# Patient Record
Sex: FEMALE | Race: WHITE | Employment: FULL TIME | URBAN - NONMETROPOLITAN AREA
[De-identification: names, ages, dates, MRNs, and addresses within clinical notes are randomized per-mention and may not be internally consistent; named-entity substitution may affect disease eponyms.]

---

## 2017-01-20 RX ORDER — METOPROLOL SUCCINATE 25 MG/1
TABLET, EXTENDED RELEASE ORAL
Qty: 90 TABLET | Refills: 3 | Status: SHIPPED | OUTPATIENT
Start: 2017-01-20

## 2017-02-01 RX ORDER — LEVOTHYROXINE SODIUM 0.1 MG/1
TABLET ORAL
Qty: 90 TABLET | Refills: 3 | Status: SHIPPED | OUTPATIENT
Start: 2017-02-01 | End: 2018-08-24 | Stop reason: SDUPTHER

## 2017-02-17 ENCOUNTER — OFFICE VISIT (OUTPATIENT)
Dept: INTERNAL MEDICINE | Age: 57
End: 2017-02-17

## 2017-02-17 VITALS
HEIGHT: 68 IN | WEIGHT: 163.6 LBS | BODY MASS INDEX: 24.8 KG/M2 | SYSTOLIC BLOOD PRESSURE: 112 MMHG | HEART RATE: 80 BPM | DIASTOLIC BLOOD PRESSURE: 68 MMHG

## 2017-02-17 DIAGNOSIS — Z13.220 SCREENING FOR LIPOID DISORDERS: ICD-10-CM

## 2017-02-17 DIAGNOSIS — E03.9 HYPOTHYROIDISM, UNSPECIFIED TYPE: Primary | ICD-10-CM

## 2017-02-17 DIAGNOSIS — I49.3 PVC'S (PREMATURE VENTRICULAR CONTRACTIONS): ICD-10-CM

## 2017-02-17 DIAGNOSIS — F32.A ANXIETY AND DEPRESSION: ICD-10-CM

## 2017-02-17 DIAGNOSIS — F41.9 ANXIETY AND DEPRESSION: ICD-10-CM

## 2017-02-17 DIAGNOSIS — E83.42 HYPOMAGNESEMIA: ICD-10-CM

## 2017-02-17 DIAGNOSIS — M85.80 OSTEOPENIA: ICD-10-CM

## 2017-02-17 PROCEDURE — 99214 OFFICE O/P EST MOD 30 MIN: CPT | Performed by: INTERNAL MEDICINE

## 2017-02-17 RX ORDER — ACETAMINOPHEN 160 MG
1 TABLET,DISINTEGRATING ORAL DAILY
COMMUNITY
End: 2021-09-21

## 2017-02-17 RX ORDER — MECLIZINE HCL 12.5 MG/1
12.5 TABLET ORAL 3 TIMES DAILY PRN
Qty: 30 TABLET | Refills: 0 | Status: SHIPPED | OUTPATIENT
Start: 2017-02-17 | End: 2018-02-23 | Stop reason: SDUPTHER

## 2017-02-17 RX ORDER — ALPRAZOLAM 0.25 MG/1
0.25 TABLET ORAL DAILY PRN
Qty: 30 TABLET | Refills: 0 | Status: SHIPPED | OUTPATIENT
Start: 2017-02-17 | End: 2018-02-23 | Stop reason: SDUPTHER

## 2017-02-17 RX ORDER — CYCLOSPORINE 0.5 MG/ML
1 EMULSION OPHTHALMIC EVERY 12 HOURS
COMMUNITY
Start: 2016-12-19 | End: 2020-06-15

## 2017-02-19 ASSESSMENT — ENCOUNTER SYMPTOMS
DOUBLE VISION: 0
WHEEZING: 0
BLURRED VISION: 0
DIARRHEA: 0
SHORTNESS OF BREATH: 0
CONSTIPATION: 0
EYE PAIN: 0
EYE DISCHARGE: 0
NAUSEA: 0
BLOOD IN STOOL: 0
SORE THROAT: 0
COUGH: 0
ABDOMINAL PAIN: 0
VOMITING: 0

## 2017-05-11 ENCOUNTER — HOSPITAL ENCOUNTER (OUTPATIENT)
Age: 57
Setting detail: SPECIMEN
Discharge: HOME | End: 2017-05-11
Payer: COMMERCIAL

## 2017-05-11 ENCOUNTER — OFFICE VISIT (OUTPATIENT)
Dept: OBGYN | Age: 57
End: 2017-05-11
Payer: COMMERCIAL

## 2017-05-11 VITALS
DIASTOLIC BLOOD PRESSURE: 68 MMHG | SYSTOLIC BLOOD PRESSURE: 112 MMHG | WEIGHT: 167.6 LBS | HEART RATE: 68 BPM | HEIGHT: 68 IN | BODY MASS INDEX: 25.4 KG/M2

## 2017-05-11 DIAGNOSIS — Z12.31 SCREENING MAMMOGRAM, ENCOUNTER FOR: ICD-10-CM

## 2017-05-11 DIAGNOSIS — Z01.419 WELL FEMALE EXAM WITH ROUTINE GYNECOLOGICAL EXAM: Primary | ICD-10-CM

## 2017-05-11 DIAGNOSIS — Z12.4 CERVICAL CANCER SCREENING: ICD-10-CM

## 2017-05-11 PROCEDURE — 99396 PREV VISIT EST AGE 40-64: CPT | Performed by: NURSE PRACTITIONER

## 2017-05-11 RX ORDER — TERBINAFINE HYDROCHLORIDE 250 MG/1
TABLET ORAL
COMMUNITY
Start: 2017-04-19 | End: 2018-02-23 | Stop reason: ALTCHOICE

## 2017-05-15 DIAGNOSIS — R92.8 FOLLOW-UP EXAMINATION OF ABNORMAL MAMMOGRAM: Primary | ICD-10-CM

## 2017-05-19 LAB — CYTOLOGY REPORT: NORMAL

## 2017-12-27 ENCOUNTER — HOSPITAL ENCOUNTER (OUTPATIENT)
Dept: GENERAL RADIOLOGY | Age: 57
Discharge: HOME | End: 2017-12-27
Payer: COMMERCIAL

## 2017-12-27 DIAGNOSIS — L60.9 NAIL DISORDER: ICD-10-CM

## 2017-12-27 PROCEDURE — 73110 X-RAY EXAM OF WRIST: CPT

## 2018-02-23 ENCOUNTER — HOSPITAL ENCOUNTER (OUTPATIENT)
Dept: LAB | Age: 58
Setting detail: SPECIMEN
Discharge: HOME | End: 2018-02-23
Payer: COMMERCIAL

## 2018-02-23 ENCOUNTER — OFFICE VISIT (OUTPATIENT)
Dept: INTERNAL MEDICINE | Age: 58
End: 2018-02-23
Payer: COMMERCIAL

## 2018-02-23 VITALS
HEART RATE: 72 BPM | SYSTOLIC BLOOD PRESSURE: 118 MMHG | BODY MASS INDEX: 23.89 KG/M2 | HEIGHT: 68 IN | DIASTOLIC BLOOD PRESSURE: 72 MMHG | WEIGHT: 157.6 LBS

## 2018-02-23 DIAGNOSIS — I49.3 PVC'S (PREMATURE VENTRICULAR CONTRACTIONS): ICD-10-CM

## 2018-02-23 DIAGNOSIS — M25.531 RIGHT WRIST PAIN: Primary | ICD-10-CM

## 2018-02-23 DIAGNOSIS — E83.42 HYPOMAGNESEMIA: ICD-10-CM

## 2018-02-23 DIAGNOSIS — E03.9 HYPOTHYROIDISM, UNSPECIFIED TYPE: ICD-10-CM

## 2018-02-23 DIAGNOSIS — M79.644 PAIN OF RIGHT THUMB: ICD-10-CM

## 2018-02-23 DIAGNOSIS — E03.9 HYPOTHYROIDISM, UNSPECIFIED TYPE: Primary | ICD-10-CM

## 2018-02-23 DIAGNOSIS — F32.A ANXIETY AND DEPRESSION: ICD-10-CM

## 2018-02-23 DIAGNOSIS — M85.80 OSTEOPENIA, UNSPECIFIED LOCATION: ICD-10-CM

## 2018-02-23 DIAGNOSIS — F41.9 ANXIETY AND DEPRESSION: ICD-10-CM

## 2018-02-23 LAB
ANION GAP SERPL CALCULATED.3IONS-SCNC: 10 MMOL/L (ref 9–17)
BUN BLDV-MCNC: 17 MG/DL (ref 6–20)
BUN/CREAT BLD: 22 (ref 9–20)
CALCIUM SERPL-MCNC: 9.9 MG/DL (ref 8.6–10.4)
CHLORIDE BLD-SCNC: 101 MMOL/L (ref 98–107)
CHOLESTEROL/HDL RATIO: 2.7
CHOLESTEROL: 192 MG/DL
CO2: 30 MMOL/L (ref 20–31)
CREAT SERPL-MCNC: 0.76 MG/DL (ref 0.5–0.9)
GFR AFRICAN AMERICAN: >60 ML/MIN
GFR NON-AFRICAN AMERICAN: >60 ML/MIN
GFR SERPL CREATININE-BSD FRML MDRD: ABNORMAL ML/MIN/{1.73_M2}
GFR SERPL CREATININE-BSD FRML MDRD: ABNORMAL ML/MIN/{1.73_M2}
GLUCOSE BLD-MCNC: 99 MG/DL (ref 70–99)
HDLC SERPL-MCNC: 71 MG/DL
LDL CHOLESTEROL: 103 MG/DL (ref 0–130)
MAGNESIUM: 2.2 MG/DL (ref 1.6–2.6)
POTASSIUM SERPL-SCNC: 4.2 MMOL/L (ref 3.7–5.3)
SODIUM BLD-SCNC: 141 MMOL/L (ref 135–144)
TRIGL SERPL-MCNC: 90 MG/DL
TSH SERPL DL<=0.05 MIU/L-ACNC: 0.55 MIU/L (ref 0.3–5)
VLDLC SERPL CALC-MCNC: NORMAL MG/DL (ref 1–30)

## 2018-02-23 PROCEDURE — 99214 OFFICE O/P EST MOD 30 MIN: CPT | Performed by: INTERNAL MEDICINE

## 2018-02-23 PROCEDURE — 80048 BASIC METABOLIC PNL TOTAL CA: CPT

## 2018-02-23 PROCEDURE — 80061 LIPID PANEL: CPT

## 2018-02-23 PROCEDURE — 83735 ASSAY OF MAGNESIUM: CPT

## 2018-02-23 PROCEDURE — 36415 COLL VENOUS BLD VENIPUNCTURE: CPT

## 2018-02-23 PROCEDURE — 84443 ASSAY THYROID STIM HORMONE: CPT

## 2018-02-23 RX ORDER — ALPRAZOLAM 0.25 MG/1
0.25 TABLET ORAL DAILY PRN
Qty: 30 TABLET | Refills: 0 | Status: SHIPPED | OUTPATIENT
Start: 2018-02-23 | End: 2019-03-22 | Stop reason: SDUPTHER

## 2018-02-23 RX ORDER — MECLIZINE HCL 12.5 MG/1
12.5 TABLET ORAL 3 TIMES DAILY PRN
Qty: 30 TABLET | Refills: 0 | Status: SHIPPED | OUTPATIENT
Start: 2018-02-23 | End: 2019-03-22

## 2018-02-23 ASSESSMENT — PATIENT HEALTH QUESTIONNAIRE - PHQ9
SUM OF ALL RESPONSES TO PHQ QUESTIONS 1-9: 0
2. FEELING DOWN, DEPRESSED OR HOPELESS: 0
SUM OF ALL RESPONSES TO PHQ9 QUESTIONS 1 & 2: 0
1. LITTLE INTEREST OR PLEASURE IN DOING THINGS: 0

## 2018-02-23 NOTE — PROGRESS NOTES
Visit Information    Have you changed or started any medications since your last visit including any over-the-counter medicines, vitamins, or herbal medicines? yes  Are you having any side effects from any of your medications? -  no  Have you stopped taking any of your medications? Is so, why? -  no    Have you seen any other physician or provider since your last visit? Yes - Records Obtained  Have you had any other diagnostic tests since your last visit? No  Have you been seen in the emergency room and/or had an admission to a hospital since we last saw you? No  Have you had your routine dental cleaning in the past 6 months? yes     Have you activated your [employer] account? If not, what are your barriers?  Yes     Patient Care Team:  Rocco Lenz MD as PCP - General (Internal Medicine)    Medical History Review  Past Medical, Family, and Social History reviewed and does contribute to the patient presenting condition    Health Maintenance   Topic Date Due    Hepatitis C screen  1960    HIV screen  04/28/1975    Shingles Vaccine (1 of 2 - 2 Dose Series) 04/28/2010    Flu vaccine (1) 09/01/2017    TSH testing  02/25/2018    Colon cancer screen colonoscopy  12/30/2018    Breast cancer screen  05/22/2019    DTaP/Tdap/Td vaccine (2 - Td) 12/04/2019    Cervical cancer screen  05/11/2020    Lipid screen  02/25/2022

## 2018-02-26 NOTE — PROGRESS NOTES
vomiting. Genitourinary:  Negative for dysuria, frequency, and hematuria. Musculoskeletal:  Positive for thumb pain. Skin:  Negative for rash. Neurological:  Negative for tingling, sensory change, speech change, focal weakness, seizures, and headaches. Endo/Heme/Allergies:  Does not bruise/bleed easily. Psychiatric/Behavioral:  Negative for hallucinations and suicidal ideas. Patient Active Problem List   Diagnosis    OA (osteoarthritis)    Hypomagnesemia    PVC's (premature ventricular contractions)    Anxiety and depression    Hypothyroidism    Deviated nasal septum    Acquired deformity of nose    Osteopenia       Medications:    Current Outpatient Prescriptions   Medication Sig Dispense Refill    ALPRAZolam (XANAX) 0.25 MG tablet Take 1 tablet by mouth daily as needed for Anxiety for up to 30 days. 30 tablet 0    meclizine (ANTIVERT) 12.5 MG tablet Take 1 tablet by mouth 3 times daily as needed for Dizziness or Nausea 30 tablet 0    RESTASIS 0.05 % ophthalmic emulsion Place 1 drop into both eyes every 12 hours       Cholecalciferol (VITAMIN D3) 2000 UNITS CAPS Take 1 capsule by mouth daily      Probiotic Product (PROBIOTIC DAILY PO) Take 1 capsule by mouth daily      levothyroxine (SYNTHROID) 100 MCG tablet TAKE 1 TABLET DAILY 90 tablet 3    metoprolol succinate (TOPROL XL) 25 MG extended release tablet TAKE 1 TABLET DAILY (Patient taking differently: Taking 1/2 tab po daily) 90 tablet 3    sertraline (ZOLOFT) 25 MG tablet TAKE 1 TABLET DAILY 90 tablet 1    Naproxen Sodium 220 MG CAPS Take 220 mg by mouth as needed 1 tablets every 12 hours      ascorbic acid (VITAMIN C) 500 MG tablet Take 500 mg by mouth daily as needed       Biotin 5000 MCG CAPS Take 2,500 mcg by mouth daily       Melatonin 10 MG CAPS   Take 10 mg by mouth nightly as needed       Omega-3 Fatty Acids (FISH OIL) 1200 MG CAPS Take 2,400 mg by mouth daily.       magnesium (MAGNESIUM-OXIDE) 250 MG TABS tablet Take 500 mg by mouth nightly.  Calcium Carbonate (CALCIUM 600 PO) Take 1 tablet by mouth daily      Multiple Vitamins-Minerals (MULTIVITAMIN GUMMIES WOMENS PO) Take by mouth 2 Gummies daily       No current facility-administered medications for this visit. Past Medical History:        Diagnosis Date    Anxiety and depression     Treated long term with SSRI.  Arthritis     Basal cell carcinoma     History of, abdomen.  Benign paroxysmal positional vertigo     History of.  Carpal tunnel syndrome     History of, EMG 12/09, with mild carpal tunnel syndrome bilaterally.  Dyslipidemia     10 year risk 1.6% 2/17    Hypomagnesemia     History of.  Hypothyroidism     Interstitial cystitis 05/07    History of interstitial cystitis with biopsy, noting interstitial cystitis.  Low magnesium levels     takes magnesium pills    Migraines     History of.    Mitral valve prolapse     Trivial mitral regurgitation on echo, 2002.  Neck pain     History of chronic neck and back pain, stable.  OA (osteoarthritis)     Osteopenia     Dexa 2016 Frax .3% 10 yr risk    PVC's (premature ventricular contractions)     PACs treated with beta blockers.  Rosacea        Family Hx & Social HX    family history includes Breast Cancer in her maternal aunt; Cancer in her father, paternal aunt, and paternal aunt; Heart Attack in her mother; High Blood Pressure in her mother; Kidney Disease in her mother; Liver Cancer in her paternal grandmother; Other in her brother; Thyroid Disease in her mother. History   Smoking Status    Never Smoker   Smokeless Tobacco    Never Used     History   Alcohol Use    0.0 oz/week     Comment: Rarely. Vitals:    02/23/18 0828   BP: 118/72   Site: Left Arm   Position: Sitting   Cuff Size: Medium Adult   Pulse: 72   Weight: 157 lb 9.6 oz (71.5 kg)   Height: 5' 8\" (1.727 m)        Vitals Reviewed. Body mass index is 23.96 kg/m².      Wt Readings from Last 3 Encounters: 02/23/18 157 lb 9.6 oz (71.5 kg)   05/11/17 167 lb 9.6 oz (76 kg)   02/17/17 163 lb 9.6 oz (74.2 kg)     BP Readings from Last 3 Encounters:   02/23/18 118/72   05/11/17 112/68   02/17/17 112/68     Physical Examination:  Constitutional:  She appears well-developed and well-nourished. No distress. HENT:  Head: Normocephalic and atraumatic. Right Ear:  External ear normal.  Left Ear:  External ear normal.  Nose:  Nose normal.  Mouth/Throat:  Oropharynx is clear and moist.  Eyes: Conjunctivae and EOM are normal.  Pupils are equal, round, and reactive to light. Right eye exhibits no discharge. Left eye exhibits no discharge. No scleral icterus. Neck:  Normal range of motion. Neck supple. No JVD present. No tracheal deviation present. No thyromegaly present. Cardiovascular:  Normal rate, normal heart sounds, and intact distal pulses. Exam reveals no gallop. Pulmonary/Chest:  Effort normal and breath sounds normal.  No respiratory distress. She has no wheezes. She has no rales. Abdominal:  Soft. Normal aorta and bowel sounds are normal.  She exhibits no distension and no mass. There is no hepatosplenomegaly. There is no tenderness. There is no rebound and no guarding. Musculoskeletal:  She exhibits no edema or tenderness. Lymphadenopathy:    She has no cervical adenopathy. Neurological:  She is alert. She has normal strength. She displays normal reflexes. No cranial nerve deficit or sensory deficit. She exhibits normal muscle tone. Skin:  Skin is warm and dry. No rash noted. She is not diaphoretic. No pallor. Psychiatric:  She has a normal mood and affect.   Her behavior is normal.  Judgment normal.    Lab Results   Component Value Date    K 4.2 02/23/2018    CREATININE 0.76 02/23/2018    AST 23 02/20/2016    ALT 23 02/20/2016    TSH 0.55 02/23/2018    HCT 42.4 02/20/2016    GLUCOSE 99 02/23/2018    MG 2.2 02/23/2018    CALCIUM 9.9 02/23/2018      Lab Results   Component Value Date    CHOL

## 2018-05-10 ENCOUNTER — OFFICE VISIT (OUTPATIENT)
Dept: OBGYN | Age: 58
End: 2018-05-10
Payer: COMMERCIAL

## 2018-05-10 ENCOUNTER — HOSPITAL ENCOUNTER (OUTPATIENT)
Age: 58
Setting detail: SPECIMEN
Discharge: HOME | End: 2018-05-10
Payer: COMMERCIAL

## 2018-05-10 ENCOUNTER — HOSPITAL ENCOUNTER (OUTPATIENT)
Dept: MAMMOGRAPHY | Age: 58
Discharge: HOME | End: 2018-05-12
Payer: COMMERCIAL

## 2018-05-10 VITALS
SYSTOLIC BLOOD PRESSURE: 112 MMHG | HEIGHT: 68 IN | BODY MASS INDEX: 24.01 KG/M2 | WEIGHT: 158.4 LBS | DIASTOLIC BLOOD PRESSURE: 68 MMHG | HEART RATE: 68 BPM

## 2018-05-10 DIAGNOSIS — Z12.4 SCREENING FOR MALIGNANT NEOPLASM OF CERVIX: ICD-10-CM

## 2018-05-10 DIAGNOSIS — Z12.31 SCREENING MAMMOGRAM, ENCOUNTER FOR: ICD-10-CM

## 2018-05-10 DIAGNOSIS — Z12.31 VISIT FOR SCREENING MAMMOGRAM: ICD-10-CM

## 2018-05-10 DIAGNOSIS — Z78.0 MENOPAUSE: ICD-10-CM

## 2018-05-10 DIAGNOSIS — Z01.419 WELL FEMALE EXAM WITH ROUTINE GYNECOLOGICAL EXAM: Primary | ICD-10-CM

## 2018-05-10 PROCEDURE — 99396 PREV VISIT EST AGE 40-64: CPT | Performed by: NURSE PRACTITIONER

## 2018-05-10 PROCEDURE — G0145 SCR C/V CYTO,THINLAYER,RESCR: HCPCS

## 2018-05-10 PROCEDURE — 77063 BREAST TOMOSYNTHESIS BI: CPT

## 2018-05-10 RX ORDER — ESTRADIOL 10 UG/1
10 INSERT VAGINAL
Qty: 24 TABLET | Refills: 5 | Status: SHIPPED | OUTPATIENT
Start: 2018-05-10 | End: 2020-06-15

## 2018-05-17 ENCOUNTER — HOSPITAL ENCOUNTER (OUTPATIENT)
Dept: BONE DENSITY | Age: 58
Discharge: HOME | End: 2018-05-19

## 2018-05-17 DIAGNOSIS — Z78.0 MENOPAUSE: ICD-10-CM

## 2018-05-17 PROCEDURE — 77080 DXA BONE DENSITY AXIAL: CPT

## 2018-06-06 LAB — CYTOLOGY REPORT: NORMAL

## 2018-08-24 RX ORDER — LEVOTHYROXINE SODIUM 0.1 MG/1
TABLET ORAL
Qty: 90 TABLET | Refills: 3 | Status: SHIPPED | OUTPATIENT
Start: 2018-08-24 | End: 2019-08-26 | Stop reason: SDUPTHER

## 2019-01-24 ENCOUNTER — TELEPHONE (OUTPATIENT)
Dept: INTERNAL MEDICINE | Age: 59
End: 2019-01-24

## 2019-01-24 DIAGNOSIS — E03.9 HYPOTHYROIDISM, UNSPECIFIED TYPE: ICD-10-CM

## 2019-01-24 DIAGNOSIS — E78.5 DYSLIPIDEMIA: ICD-10-CM

## 2019-01-24 DIAGNOSIS — E83.42 HYPOMAGNESEMIA: Primary | ICD-10-CM

## 2019-03-20 ENCOUNTER — HOSPITAL ENCOUNTER (OUTPATIENT)
Dept: LAB | Age: 59
Discharge: HOME | End: 2019-03-20
Payer: COMMERCIAL

## 2019-03-20 DIAGNOSIS — E78.5 DYSLIPIDEMIA: ICD-10-CM

## 2019-03-20 DIAGNOSIS — E03.9 HYPOTHYROIDISM, UNSPECIFIED TYPE: ICD-10-CM

## 2019-03-20 DIAGNOSIS — E83.42 HYPOMAGNESEMIA: ICD-10-CM

## 2019-03-20 LAB
ABSOLUTE EOS #: 0.1 K/UL (ref 0–0.4)
ABSOLUTE IMMATURE GRANULOCYTE: NORMAL K/UL (ref 0–0.3)
ABSOLUTE LYMPH #: 1.8 K/UL (ref 1–4.8)
ABSOLUTE MONO #: 0.5 K/UL (ref 0.1–1.2)
ANION GAP SERPL CALCULATED.3IONS-SCNC: 13 MMOL/L (ref 9–17)
BASOPHILS # BLD: 1 % (ref 0–1)
BASOPHILS ABSOLUTE: 0.1 K/UL (ref 0–0.2)
BUN BLDV-MCNC: 16 MG/DL (ref 6–20)
BUN/CREAT BLD: 21 (ref 9–20)
CALCIUM SERPL-MCNC: 9.7 MG/DL (ref 8.6–10.4)
CHLORIDE BLD-SCNC: 102 MMOL/L (ref 98–107)
CHOLESTEROL/HDL RATIO: 2.6
CHOLESTEROL: 192 MG/DL
CO2: 28 MMOL/L (ref 20–31)
CREAT SERPL-MCNC: 0.77 MG/DL (ref 0.5–0.9)
DIFFERENTIAL TYPE: NORMAL
EOSINOPHILS RELATIVE PERCENT: 3 % (ref 1–7)
GFR AFRICAN AMERICAN: >60 ML/MIN
GFR NON-AFRICAN AMERICAN: >60 ML/MIN
GFR SERPL CREATININE-BSD FRML MDRD: ABNORMAL ML/MIN/{1.73_M2}
GFR SERPL CREATININE-BSD FRML MDRD: ABNORMAL ML/MIN/{1.73_M2}
GLUCOSE BLD-MCNC: 90 MG/DL (ref 70–99)
HCT VFR BLD CALC: 42.4 % (ref 36–46)
HDLC SERPL-MCNC: 73 MG/DL
HEMOGLOBIN: 13.8 G/DL (ref 12–16)
IMMATURE GRANULOCYTES: NORMAL %
LDL CHOLESTEROL: 103 MG/DL (ref 0–130)
LYMPHOCYTES # BLD: 34 % (ref 16–46)
MAGNESIUM: 2.1 MG/DL (ref 1.6–2.6)
MCH RBC QN AUTO: 31.5 PG (ref 26–34)
MCHC RBC AUTO-ENTMCNC: 32.6 G/DL (ref 31–37)
MCV RBC AUTO: 96.4 FL (ref 80–100)
MONOCYTES # BLD: 9 % (ref 4–11)
NRBC AUTOMATED: NORMAL PER 100 WBC
PDW BLD-RTO: 13.1 % (ref 11–14.5)
PLATELET # BLD: 326 K/UL (ref 140–450)
PLATELET ESTIMATE: NORMAL
PMV BLD AUTO: 8.1 FL (ref 6–12)
POTASSIUM SERPL-SCNC: 4.2 MMOL/L (ref 3.7–5.3)
RBC # BLD: 4.4 M/UL (ref 4–5.2)
RBC # BLD: NORMAL 10*6/UL
SEG NEUTROPHILS: 53 % (ref 43–77)
SEGMENTED NEUTROPHILS ABSOLUTE COUNT: 2.8 K/UL (ref 1.8–7.7)
SODIUM BLD-SCNC: 143 MMOL/L (ref 135–144)
TRIGL SERPL-MCNC: 79 MG/DL
TSH SERPL DL<=0.05 MIU/L-ACNC: 0.63 MIU/L (ref 0.3–5)
VLDLC SERPL CALC-MCNC: NORMAL MG/DL (ref 1–30)
WBC # BLD: 5.2 K/UL (ref 3.5–11)
WBC # BLD: NORMAL 10*3/UL

## 2019-03-20 PROCEDURE — 83735 ASSAY OF MAGNESIUM: CPT

## 2019-03-20 PROCEDURE — 84443 ASSAY THYROID STIM HORMONE: CPT

## 2019-03-20 PROCEDURE — 36415 COLL VENOUS BLD VENIPUNCTURE: CPT

## 2019-03-20 PROCEDURE — 80048 BASIC METABOLIC PNL TOTAL CA: CPT

## 2019-03-20 PROCEDURE — 80061 LIPID PANEL: CPT

## 2019-03-20 PROCEDURE — 85025 COMPLETE CBC W/AUTO DIFF WBC: CPT

## 2019-03-22 ENCOUNTER — OFFICE VISIT (OUTPATIENT)
Dept: INTERNAL MEDICINE | Age: 59
End: 2019-03-22
Payer: COMMERCIAL

## 2019-03-22 VITALS
HEART RATE: 60 BPM | WEIGHT: 168.4 LBS | HEIGHT: 68 IN | BODY MASS INDEX: 25.52 KG/M2 | SYSTOLIC BLOOD PRESSURE: 108 MMHG | DIASTOLIC BLOOD PRESSURE: 60 MMHG

## 2019-03-22 DIAGNOSIS — F41.9 ANXIETY: Primary | ICD-10-CM

## 2019-03-22 DIAGNOSIS — M85.80 OSTEOPENIA, UNSPECIFIED LOCATION: ICD-10-CM

## 2019-03-22 DIAGNOSIS — E03.9 HYPOTHYROIDISM, UNSPECIFIED TYPE: ICD-10-CM

## 2019-03-22 DIAGNOSIS — Z00.00 ENCOUNTER FOR WELLNESS EXAMINATION: Primary | ICD-10-CM

## 2019-03-22 DIAGNOSIS — E78.5 DYSLIPIDEMIA: ICD-10-CM

## 2019-03-22 DIAGNOSIS — F32.A ANXIETY AND DEPRESSION: ICD-10-CM

## 2019-03-22 DIAGNOSIS — F41.9 ANXIETY AND DEPRESSION: ICD-10-CM

## 2019-03-22 DIAGNOSIS — E83.42 HYPOMAGNESEMIA: ICD-10-CM

## 2019-03-22 PROCEDURE — 99396 PREV VISIT EST AGE 40-64: CPT | Performed by: INTERNAL MEDICINE

## 2019-03-22 RX ORDER — ALPRAZOLAM 0.25 MG/1
0.25 TABLET ORAL DAILY PRN
Qty: 30 TABLET | Refills: 0 | Status: CANCELLED | OUTPATIENT
Start: 2019-03-22 | End: 2019-04-21

## 2019-03-22 RX ORDER — ALPRAZOLAM 0.25 MG/1
0.25 TABLET ORAL DAILY PRN
Qty: 30 TABLET | Refills: 0 | Status: SHIPPED | OUTPATIENT
Start: 2019-03-22 | End: 2019-08-26 | Stop reason: SDUPTHER

## 2019-03-22 ASSESSMENT — PATIENT HEALTH QUESTIONNAIRE - PHQ9
2. FEELING DOWN, DEPRESSED OR HOPELESS: 0
1. LITTLE INTEREST OR PLEASURE IN DOING THINGS: 0
SUM OF ALL RESPONSES TO PHQ9 QUESTIONS 1 & 2: 0
SUM OF ALL RESPONSES TO PHQ QUESTIONS 1-9: 0
SUM OF ALL RESPONSES TO PHQ QUESTIONS 1-9: 0

## 2019-05-23 ENCOUNTER — HOSPITAL ENCOUNTER (OUTPATIENT)
Dept: MAMMOGRAPHY | Age: 59
Discharge: HOME | End: 2019-05-25
Payer: COMMERCIAL

## 2019-05-23 ENCOUNTER — HOSPITAL ENCOUNTER (OUTPATIENT)
Age: 59
Setting detail: SPECIMEN
Discharge: HOME | End: 2019-05-23
Payer: COMMERCIAL

## 2019-05-23 ENCOUNTER — OFFICE VISIT (OUTPATIENT)
Dept: OBGYN | Age: 59
End: 2019-05-23
Payer: COMMERCIAL

## 2019-05-23 VITALS
HEIGHT: 69 IN | BODY MASS INDEX: 25.03 KG/M2 | WEIGHT: 169 LBS | DIASTOLIC BLOOD PRESSURE: 60 MMHG | SYSTOLIC BLOOD PRESSURE: 96 MMHG | HEART RATE: 70 BPM

## 2019-05-23 DIAGNOSIS — Z12.4 CERVICAL CANCER SCREENING: ICD-10-CM

## 2019-05-23 DIAGNOSIS — Z12.31 VISIT FOR SCREENING MAMMOGRAM: ICD-10-CM

## 2019-05-23 DIAGNOSIS — Z78.0 MENOPAUSE: ICD-10-CM

## 2019-05-23 DIAGNOSIS — Z01.419 WELL FEMALE EXAM WITH ROUTINE GYNECOLOGICAL EXAM: ICD-10-CM

## 2019-05-23 DIAGNOSIS — Z12.4 CERVICAL CANCER SCREENING: Primary | ICD-10-CM

## 2019-05-23 PROCEDURE — 77067 SCR MAMMO BI INCL CAD: CPT

## 2019-05-23 PROCEDURE — G0145 SCR C/V CYTO,THINLAYER,RESCR: HCPCS

## 2019-05-23 PROCEDURE — 99396 PREV VISIT EST AGE 40-64: CPT | Performed by: NURSE PRACTITIONER

## 2019-05-23 NOTE — PROGRESS NOTES
Elba Ott Arps  2019              61 y.o. Chief Complaint   Patient presents with    Gynecologic Exam     pap last pap 5/10/18         No LMP recorded. Patient has had a hysterectomy. No referring provider defined for this encounter. HPI :Annual Exam  Patient presents for annual exam.  Counseling on healthy lifestyle reviewed, as well as the need for self breast exam.  We discussed and reviewed the need for routine screenings and immunization updates when appropriate. Good bladder control. Not using vagifem due to cost.  Will try a sample of Premarin cream and call if she desires a prescription. Performs monthly self breast exams. The patient is sexually active. last pap: was normal, last mammogram: was normal  The patient has regular exercise: yes . We did review the need for and frequency of both weight bearing and strengthening as tolerated by the patient. ________________________________________________________________________  OB History    Para Term  AB Living   2 2 2 0 0 2   SAB TAB Ectopic Molar Multiple Live Births   0 0 0 0 0 0      # Outcome Date GA Lbr David/2nd Weight Sex Delivery Anes PTL Lv   2 Term     F Vag-Spont         Birth Comments: on bed rest for 5 weeks delivered ant 36 weeks   1 Term     M Vag-Spont         Birth Comments: born 31 weeks     Past Medical History:   Diagnosis Date    Anxiety and depression     Treated long term with SSRI.  Arthritis     Basal cell carcinoma     History of, abdomen.  Benign paroxysmal positional vertigo     History of.  Carpal tunnel syndrome     History of, EMG , with mild carpal tunnel syndrome bilaterally.  Dyslipidemia     10 year risk 1.6%     Hypomagnesemia     History of.  Hypothyroidism     Interstitial cystitis     History of interstitial cystitis with biopsy, noting interstitial cystitis.  Low magnesium levels     takes magnesium pills    Migraines     History of.  Mitral valve prolapse     Trivial mitral regurgitation on echo, 2002.  Neck pain     History of chronic neck and back pain, stable.  OA (osteoarthritis)     Osteopenia     Dexa 2016 Frax .3% 10 yr risk, DEXA 5/18 FRAX .5% 10 yr risk for HIP fracture    PVC's (premature ventricular contractions)     PACs treated with beta blockers.  Rosacea                                                                    Past Surgical History:   Procedure Laterality Date    COLONOSCOPY  09/12/02    COLONOSCOPY  12-30-13    normal cs, repeat 5 yrs family hx colon cancer    CYSTOSCOPY  05/11/07    DILATION AND CURETTAGE OF UTERUS  09/27/01    Hysteroscopy.  ELBOW SURGERY      Right elbow epicondylitis surgery in October 2010 with Dr. Jose Garcia.  NOSE SURGERY  04/19/05    Status post fracture with turbinectomy, polyp removed and septoplasty.  NOSE SURGERY  3/18/2005    septorhinoplasty    OTHER SURGICAL HISTORY      TVT. Anterior repair for cystocele done in her 35s.      PARTIAL HYSTERECTOMY  12/18/01    Supracervical.  Ovaries remain    SKIN CANCER EXCISION      Basal cell carcinoma removed from the abdomen in the past.       Family History   Problem Relation Age of Onset    Cancer Father         Bowel, rectal, prostate and skin    Colon Cancer Father     Other Brother         Pneumonia    Heart Attack Mother     High Blood Pressure Mother     Kidney Disease Mother     Thyroid Disease Mother     Breast Cancer Maternal Aunt     Colon Cancer Paternal Aunt     Liver Cancer Paternal Grandmother     Colon Cancer Paternal Aunt     Colon Cancer Sister     Colon Cancer Brother     Colon Cancer Paternal Aunt     Colon Cancer Paternal Aunt     Colon Cancer Paternal Uncle     Colon Cancer Paternal Uncle      Social History     Socioeconomic History    Marital status:      Spouse name: Not on file    Number of children: Not on file    Years of education: Not on file    Highest education level: Not on file   Occupational History    Not on file   Social Needs    Financial resource strain: Not on file    Food insecurity:     Worry: Not on file     Inability: Not on file    Transportation needs:     Medical: Not on file     Non-medical: Not on file   Tobacco Use    Smoking status: Never Smoker    Smokeless tobacco: Never Used   Substance and Sexual Activity    Alcohol use: Yes     Alcohol/week: 0.0 oz     Comment: Rarely.     Drug use: No    Sexual activity: Not on file   Lifestyle    Physical activity:     Days per week: Not on file     Minutes per session: Not on file    Stress: Not on file   Relationships    Social connections:     Talks on phone: Not on file     Gets together: Not on file     Attends Sabianism service: Not on file     Active member of club or organization: Not on file     Attends meetings of clubs or organizations: Not on file     Relationship status: Not on file    Intimate partner violence:     Fear of current or ex partner: Not on file     Emotionally abused: Not on file     Physically abused: Not on file     Forced sexual activity: Not on file   Other Topics Concern    Not on file   Social History Narrative    Not on file       MEDICATIONS:  Current Outpatient Medications   Medication Sig Dispense Refill    levothyroxine (SYNTHROID) 100 MCG tablet TAKE 1 TABLET DAILY 90 tablet 3    Cholecalciferol (VITAMIN D3) 2000 UNITS CAPS Take 1 capsule by mouth daily      Probiotic Product (PROBIOTIC DAILY PO) Take 1 capsule by mouth daily      Calcium Carbonate (CALCIUM 600 PO) Take 1 tablet by mouth daily      metoprolol succinate (TOPROL XL) 25 MG extended release tablet TAKE 1 TABLET DAILY (Patient taking differently: Taking 1/2 tab po daily) 90 tablet 3    sertraline (ZOLOFT) 25 MG tablet TAKE 1 TABLET DAILY 90 tablet 1    Naproxen Sodium 220 MG CAPS Take 220 mg by mouth as needed 1 tablets every 12 hours      Multiple Vitamins-Minerals (MULTIVITAMIN GUMMIES WOMENS PO) Take by mouth 2 Gummies daily      ascorbic acid (VITAMIN C) 500 MG tablet Take 500 mg by mouth daily as needed       Biotin 5000 MCG CAPS Take 2,500 mcg by mouth daily       Omega-3 Fatty Acids (FISH OIL) 1200 MG CAPS Take 2,400 mg by mouth daily.  magnesium (MAGNESIUM-OXIDE) 250 MG TABS tablet Take 500 mg by mouth nightly.  Estradiol (YUVAFEM) 10 MCG TABS vaginal tablet Place 1 tablet vaginally Twice a Week 24 tablet 5    RESTASIS 0.05 % ophthalmic emulsion Place 1 drop into both eyes every 12 hours        No current facility-administered medications for this visit. ALLERGIES:  Allergies as of 05/23/2019 - Review Complete 05/23/2019   Allergen Reaction Noted    Betadine [povidone iodine] Swelling and Rash 10/14/2013    Vicodin [hydrocodone-acetaminophen] Itching and Rash 10/14/2013           Gynecologic History:  Menarche: 13   Menopause at hysterectomy      No LMP recorded. Patient has had a hysterectomy. Hormone Exposure: Yes     Family History of Breast, Ovarian , Colon or Uterine Cancer: Yes See family history     Preventative Health Testing:  Date of Last Pap Smear: 2018  Date of Last Mammogram: 2018  Date of Last Colonoscopy: 2013  Date of Last Bone Density: 2018  ________________________________________________________________________  REVIEW OF SYSTEMS:     A minimum of an eleven point review of systems was completed.     Review Of Systems (11 point):  General ROS:  negative  Hematological and Lymphatic ROS:negative   Breast ROS: negative  Cardiovascular ROS: negative  Respiratory ROS: negative   Gastrointestinal ROS: negative  Genito-Urinary ROS: positive for vaginal dryness  Psychological ROS: negative  Neurological ROS: negative  Musculoskeletal ROS: negative  Dermatological ROS: negative PHYSICAL Exam:     Constitutional:  Blood pressure 96/60, pulse 70, height 5' 8.5\" (1.74 m), weight 169 lb (76.7 kg), not currently breastfeeding. General Appearance: This  is a well Developed, well Nourished, well groomed female. Her BMI was reviewed. Skin:  There was a Normal Inspection of the skin without rashes or lesions. There were no rashes. (Papular, Maculopapular, Hives, Pustular, Macular)     There were no lesions (Ulcers, Erythema, Abn. Appearing Nevi)      Lymphatic:  No Lymph Nodes were Palpable in the neck , axilla or groin. Neck and EENT:  The neck was supple. There were no masses   The thyroid was not enlarged and had no masses. PERRLA, Nares Patent No Masses    Respiratory: The lungs were auscultated and found to be clear. There were no rales, rhonchi or wheezes. There was a good respiratory effort. Cardiovascular: The heart was in a regular rate and rhythm. . No S3 or S4. There was no murmur appreciated. Extremities: The patients extremities were without calf tenderness, edema, or varicosities. There was full range of motion in all four extremities. Pulses in all four extremities    Abdomen: The abdomen was soft and non-tender. There were good bowel sounds in all quadrants and there was no guarding, rebound or rigidity. On evaluation there was no evidence of hepatosplenomegaly and there was no costal vertebral sean tenderness bilaterally. No hernias were appreciated. Psych:   The patient had a normal Orientation to: Time, Place, Person, and Situation  Mood and affect appropriate    Breast:  (Chest)  normal appearance, no masses or tenderness, Inspection negative, No nipple retraction or dimpling, No nipple discharge or bleeding, No axillary or supraclavicular adenopathy, Normal to palpation without dominant masses, negative findings: normal in size and symmetry, normal contour with no evidence of flattening or dimpling, skin normal, nipples everted without rashes or discharge, palpation negative for masses or nodules, no palpable axillary lymphadenopathy      Pelvic Exam:  External genitalia: normal general appearance  Urinary system: urethral meatus normal  Vaginal: normal mucosa without prolapse or lesions, normal without tenderness, induration or masses and mild atrophy  Cervix: normal appearance and thin prep PAP obtained  Adnexa: normal bimanual exam and non palpable  Uterus: absent and removed surgically    Musculosk:  Normal Gait and station was noted. Digits were evaluated without abnormal findings. Range of motion, stability and strength were evaluated and found to be appropriate for the patients age. ASSESSMENT:      61 y.o. Annual   Diagnosis Orders   1. Cervical cancer screening  PAP SMEAR   2. Well female exam with routine gynecological exam     3. Visit for screening mammogram  DOC DIGITAL SCREEN W CAD BILATERAL   4. Menopause  DEXA AXIAL SKELETON W VERTEBRAL FX ASST        Chief Complaint   Patient presents with    Gynecologic Exam     pap last pap 5/10/18         Past Medical History:   Diagnosis Date    Anxiety and depression     Treated long term with SSRI.  Arthritis     Basal cell carcinoma     History of, abdomen.  Benign paroxysmal positional vertigo     History of.  Carpal tunnel syndrome     History of, EMG 12/09, with mild carpal tunnel syndrome bilaterally.  Dyslipidemia     10 year risk 1.6% 2/17    Hypomagnesemia     History of.  Hypothyroidism     Interstitial cystitis 05/07    History of interstitial cystitis with biopsy, noting interstitial cystitis.  Low magnesium levels     takes magnesium pills    Migraines     History of.    Mitral valve prolapse     Trivial mitral regurgitation on echo, 2002.  Neck pain     History of chronic neck and back pain, stable.      OA (osteoarthritis)     Osteopenia     Dexa 2016 Frax .3% 10 yr risk, DEXA 5/18 FRAX .5% 10 yr risk for HIP fracture    PVC's (premature ventricular contractions)     PACs treated with beta blockers.  Rosacea          Patient Active Problem List   Diagnosis    OA (osteoarthritis)    Hypomagnesemia    PVC's (premature ventricular contractions)    Anxiety and depression    Hypothyroidism    Deviated nasal septum    Acquired deformity of nose    Osteopenia          Hereditary Breast, Ovarian, Colon and Uterine Cancer screening Done. Tobacco & Secondary smoke risks reviewed; instructed on cessation and avoidance    PLAN:  No follow-ups on file. Return for annual exams  Mammograms every 1 year. If 35 yo and last mammogram was negative. Routine health maintenance per patients PCP. Orders Placed This Encounter   Procedures    DOC DIGITAL SCREEN W CAD BILATERAL     Standing Status:   Future     Standing Expiration Date:   11/23/2020     Scheduling Instructions: On the day of the exam, the patient should not wear deodorant, lotion, or powder on the breast or underarm area. Wear a two piece outfit and be prepared to give information about prior breast procedures including mammograms, biopsies, or surgeries. If you've had mammograms done elsewhere, please request any previous mammogram films from those organizations prior to your appointment. Order Specific Question:   Reason for exam:     Answer:   SCREENING MAMMOGRAM    DEXA AXIAL SKELETON W VERTEBRAL FX ASST     Standing Status:   Future     Standing Expiration Date:   11/23/2020     Order Specific Question:   Reason for exam:     Answer:   Screening osteoporosis    PAP SMEAR     Patient History:    No LMP recorded. Patient has had a hysterectomy. OBGYN Status: Hysterectomy  Past Surgical History:  09/12/02: COLONOSCOPY  12-30-13: COLONOSCOPY      Comment:  normal cs, repeat 5 yrs family hx colon cancer  05/11/07: CYSTOSCOPY  09/27/01: DILATION AND CURETTAGE OF UTERUS      Comment:  Hysteroscopy.   No date: ELBOW SURGERY      Comment:  Right elbow epicondylitis surgery in October 2010 with                Dr. Silvino Soni. 04/19/05: NOSE SURGERY      Comment:  Status post fracture with turbinectomy, polyp removed                and septoplasty. 3/18/2005: NOSE SURGERY      Comment:  septorhinoplasty  No date: OTHER SURGICAL HISTORY      Comment:  TVT. Anterior repair for cystocele done in her 35s.   12/18/01: PARTIAL HYSTERECTOMY      Comment:  Supracervical.  Ovaries remain  No date: SKIN CANCER EXCISION      Comment:  Basal cell carcinoma removed from the abdomen in the                past.        Social History    Tobacco Use      Smoking status: Never Smoker      Smokeless tobacco: Never Used       Standing Status:   Future     Standing Expiration Date:   5/23/2020     Order Specific Question:   Collection Type     Answer: Thin Prep     Order Specific Question:   Prior Abnormal Pap Test     Answer:   No     Order Specific Question:   Screening or Diagnostic     Answer:   Screening     Order Specific Question:   HPV Requested?      Answer:   Yes - If Abnormal Reflex HPV     Order Specific Question:   High Risk Patient     Answer:   N/A       Jimbo Ye  5/23/2019

## 2019-05-28 LAB — CYTOLOGY REPORT: NORMAL

## 2019-08-26 DIAGNOSIS — F41.9 ANXIETY: ICD-10-CM

## 2019-08-26 RX ORDER — LEVOTHYROXINE SODIUM 0.1 MG/1
TABLET ORAL
Qty: 90 TABLET | Refills: 3 | Status: SHIPPED | OUTPATIENT
Start: 2019-08-26 | End: 2020-09-01

## 2019-08-26 RX ORDER — ALPRAZOLAM 0.25 MG/1
0.25 TABLET ORAL DAILY PRN
Qty: 30 TABLET | Refills: 0 | Status: SHIPPED | OUTPATIENT
Start: 2019-08-26 | End: 2019-09-25

## 2019-10-18 ENCOUNTER — HOSPITAL ENCOUNTER (OUTPATIENT)
Dept: MRI IMAGING | Age: 59
Discharge: HOME | End: 2019-10-20
Payer: COMMERCIAL

## 2019-10-18 DIAGNOSIS — R42 DIZZINESS AND GIDDINESS: ICD-10-CM

## 2019-10-18 PROCEDURE — 2709999900 MRI BRAIN W WO CONTRAST

## 2019-10-18 PROCEDURE — A9579 GAD-BASE MR CONTRAST NOS,1ML: HCPCS | Performed by: PHYSICIAN ASSISTANT

## 2019-10-18 PROCEDURE — 6360000004 HC RX CONTRAST MEDICATION: Performed by: PHYSICIAN ASSISTANT

## 2019-10-18 RX ADMIN — GADOTERIDOL 15 ML: 279.3 INJECTION, SOLUTION INTRAVENOUS at 09:13

## 2020-05-15 LAB
BUN BLDV-MCNC: 19 MG/DL
CALCIUM SERPL-MCNC: 9.9 MG/DL
CHLORIDE BLD-SCNC: 104 MMOL/L
CHOLESTEROL, TOTAL: 189 MG/DL
CHOLESTEROL/HDL RATIO: 3.2
CO2: 27 MMOL/L
CREAT SERPL-MCNC: 0.81 MG/DL
GFR CALCULATED: 79
GLUCOSE BLD-MCNC: 82 MG/DL
HCT VFR BLD CALC: 44.6 % (ref 36–46)
HDLC SERPL-MCNC: 60 MG/DL (ref 35–70)
HEMOGLOBIN: 4.73 G/DL (ref 12–16)
LDL CHOLESTEROL CALCULATED: 105 MG/DL (ref 0–160)
MAGNESIUM: 2.1 MG/DL
PLATELET # BLD: 410 K/ΜL
POTASSIUM SERPL-SCNC: 4.4 MMOL/L
SODIUM BLD-SCNC: 140 MMOL/L
TRIGL SERPL-MCNC: 139 MG/DL
TSH SERPL DL<=0.05 MIU/L-ACNC: 0.52 UIU/ML
VLDLC SERPL CALC-MCNC: 129 MG/DL
WBC # BLD: 11.4 10^3/ML

## 2020-05-27 ENCOUNTER — VIRTUAL VISIT (OUTPATIENT)
Dept: INTERNAL MEDICINE | Age: 60
End: 2020-05-27
Payer: COMMERCIAL

## 2020-05-27 VITALS — WEIGHT: 169 LBS | BODY MASS INDEX: 25.32 KG/M2

## 2020-05-27 PROCEDURE — 99214 OFFICE O/P EST MOD 30 MIN: CPT | Performed by: INTERNAL MEDICINE

## 2020-05-27 RX ORDER — CELECOXIB 200 MG/1
200 CAPSULE ORAL 2 TIMES DAILY PRN
COMMUNITY
End: 2021-09-21

## 2020-05-27 RX ORDER — ALPRAZOLAM 0.25 MG/1
0.25 TABLET ORAL DAILY PRN
Qty: 30 TABLET | Refills: 0 | Status: SHIPPED | OUTPATIENT
Start: 2020-05-27 | End: 2022-02-14 | Stop reason: SDUPTHER

## 2020-05-27 NOTE — PROGRESS NOTES
[place] [address]  Dept: [phone]  Dept Fax: [phone]  Loc: [phone]     Visit Date:  5/27/2020    Patient:  Maria E Rodriguez  YOB: 1960  Provider: Silvino Chapman MD        NEXT VISIT: Follow-up on hip and back pain      HPI:   She underwent an audio/video evaluation today for   Chief Complaint   Patient presents with    Hypothyroidism          Complains of pain in her back that has been going on for the last several years, but has been getting worse over the last several months. Pain is sharp, radiates down her left hip and left leg. Worse with walking and standing, and says that the pain prevents her from walking. Denies fever, chills, bowel/bladder incontinence, weight loss. Moreover, has been also complaining of left hip pain, says that pain in her hip feels like it is in her joint, but is unsure whether it is radiating from her back or not. Worse with walking and standing. Has been prescribed NSAIDs as well as Flexeril, says that there is minimal benefit. Has a history of hypothyroidism which has been stable.     Has a history of anxiety which has been stable on Zoloft as well as Xanax which he uses occasionally      Subjective:      REVIEW OF SYSTEMS    Constitutional: Denies fever, chills  Eyes: Denies recent vision changes  Cardiovascular: Denies chest pain, LL edema  Respiratory: Denies cough, wheezes  Gastrointestinal: Denies abdominal pain, nausea, vomiting  Skin: Denies rash  Endocrine: Denies polyuria  Hematologic: Denies bruising  Genitourinary: Denies dysuria, hematuria  Neurological: Denies headache, numbness        Medications    Current Outpatient Medications:     celecoxib (CELEBREX) 200 MG capsule, Take 200 mg by mouth 2 times daily as needed for Pain, Disp: , Rfl:     ALPRAZolam (XANAX) 0.25 MG tablet, Take 1 tablet by mouth daily as needed for Anxiety for up to 30 days. , Disp: 30 tablet, Rfl: 0    levothyroxine (SYNTHROID) 100 MCG tablet, TAKE 1 TABLET DAILY, Disp: 90 tablet, Rfl: 3    Cholecalciferol (VITAMIN D3) 2000 UNITS CAPS, Take 1 capsule by mouth daily, Disp: , Rfl:     Probiotic Product (PROBIOTIC DAILY PO), Take 1 capsule by mouth daily, Disp: , Rfl:     Calcium Carbonate (CALCIUM 600 PO), Take 1 tablet by mouth daily, Disp: , Rfl:     metoprolol succinate (TOPROL XL) 25 MG extended release tablet, TAKE 1 TABLET DAILY (Patient taking differently: Taking 1/2 tab po daily), Disp: 90 tablet, Rfl: 3    sertraline (ZOLOFT) 25 MG tablet, TAKE 1 TABLET DAILY, Disp: 90 tablet, Rfl: 1    Naproxen Sodium 220 MG CAPS, Take 220 mg by mouth as needed 1 tablets every 12 hours, Disp: , Rfl:     ascorbic acid (VITAMIN C) 500 MG tablet, Take 500 mg by mouth daily as needed , Disp: , Rfl:     Biotin 5000 MCG CAPS, Take 2,500 mcg by mouth daily , Disp: , Rfl:     magnesium (MAGNESIUM-OXIDE) 250 MG TABS tablet, Take 500 mg by mouth nightly., Disp: , Rfl:     Estradiol (YUVAFEM) 10 MCG TABS vaginal tablet, Place 1 tablet vaginally Twice a Week, Disp: 24 tablet, Rfl: 5    RESTASIS 0.05 % ophthalmic emulsion, Place 1 drop into both eyes every 12 hours , Disp: , Rfl:     Multiple Vitamins-Minerals (MULTIVITAMIN GUMMIES WOMENS PO), Take by mouth 2 Gummies daily, Disp: , Rfl:     Omega-3 Fatty Acids (FISH OIL) 1200 MG CAPS, Take 2,400 mg by mouth daily. , Disp: , Rfl:     The patient is allergic to betadine [povidone iodine] and vicodin [hydrocodone-acetaminophen].     Past Medical History  Stephany Maldonado  has a past medical history of Anxiety and depression, Arthritis, Basal cell carcinoma, Benign paroxysmal positional vertigo, Carpal tunnel syndrome, Dyslipidemia, Hypomagnesemia, Hypothyroidism, Interstitial cystitis, Low magnesium levels, Migraines, Mitral valve prolapse, Neck pain, OA (osteoarthritis), Osteopenia, PVC's (premature ventricular contractions), and Rosacea. Past Surgical History  The patient  has a past surgical history that includes other surgical history; partial hysterectomy (cervix not removed) (12/18/01); Nose surgery (04/19/05); Skin cancer excision; Elbow surgery; Cystoscopy (05/11/07); Dilation and curettage of uterus (09/27/01); Colonoscopy (09/12/02); Colonoscopy (12-30-13); and Nose surgery (3/18/2005). Family History  This patient's family history includes Breast Cancer in her maternal aunt; Cancer in her father; Colon Cancer in her brother, father, paternal aunt, paternal aunt, paternal aunt, paternal aunt, paternal uncle, paternal uncle, and sister; Heart Attack in her mother; High Blood Pressure in her mother; Kidney Disease in her mother; Liver Cancer in her paternal grandmother; Other in her brother; Thyroid Disease in her mother. Social History  Criss Nassar  reports that she has never smoked. She has never used smokeless tobacco. She reports current alcohol use. She reports that she does not use drugs. Health Maintenance:    Health Maintenance   Topic Date Due    Colon cancer screen colonoscopy  12/30/2018    DTaP/Tdap/Td vaccine (2 - Td) 12/04/2019    Hepatitis C screen  02/12/2021 (Originally 1960)    HIV screen  02/25/2022 (Originally 4/28/1975)    TSH testing  05/15/2021    Breast cancer screen  05/23/2021    Cervical cancer screen  05/23/2022    Lipid screen  05/15/2025    Flu vaccine  Completed    Shingles Vaccine  Completed    Hepatitis A vaccine  Aged Out    Hepatitis B vaccine  Aged Out    Hib vaccine  Aged Out    Meningococcal (ACWY) vaccine  Aged Out    Pneumococcal 0-64 years Vaccine  Aged Out           Objective:     PHYSICAL EXAM  Due to this being a TeleHealth encounter, evaluation of the following organ systems is limited: Vitals/Constitutional/EENT/Resp/CV/GI//MS/Neuro/Skin/Heme-Lymph-Imm. Constitutional: No acute distress. Sits in chair comfortably. Eyes: No proptosis.  No visible discharge  HENT: External ears normal. No external lesions on the nose  Neck: No gross masses. No visible lesions. Respiratory: Respiratory effort normal. No visible signs of difficulty breathing  Skin: No abnormal rashes. No abnormal masses  Neurologic: Cranial nerves grossly intact  Psychiatric: Normal affect. Alert and oriented        Labs Reviewed 5/27/2020:    Lab Results   Component Value Date    WBC 11.4 05/15/2020    HGB 4.73 (A) 05/15/2020    HCT 44.6 05/15/2020     05/15/2020    CHOL 189 05/15/2020    TRIG 139 05/15/2020    HDL 60 05/15/2020    ALT 23 02/20/2016    AST 23 02/20/2016     05/15/2020    K 4.4 05/15/2020     05/15/2020    CREATININE 0.81 05/15/2020    BUN 19 05/15/2020    CO2 27 05/15/2020    TSH 0.52 05/15/2020       Plan        Chronic low back pain: Given that it prevents her ADLs and affects her mobility, will order MRI of the LS spine. Reassess next visit. Already uses NSAIDs, Flexeril, with minimal benefit. Left hip pain: We will order x-ray of the left hip. Reassess next visit. Hypothyroidism: TSH normal.  Continue levothyroxine. Anxiety: Uses Zoloft as well as Xanax sporadically. Stable. Can continue       Diagnosis Orders   1. Hip pain, chronic, left  XR HIP LEFT (2-3 VIEWS)   2. Chronic low back pain, unspecified back pain laterality, unspecified whether sciatica present  MRI Lumbar Spine WO Contrast   3. Encounter for screening for malignant neoplasm of colon  Sana Soriano MD, General Surgery, [place]   4. Anxiety and depression  ALPRAZolam (XANAX) 0.25 MG tablet           Discussed use, benefit, and side effects of prescribed medications. All patient questions answered. Pt voiced understanding. Reviewed health maintenance. Electronically signed Neena Kruse MD on 5/27/2020 at 9:08 AM      This note has been created using the Epic electronic health record, and dictated in part by [employer] dictation system.  Despite the documenting physician's best efforts, there may be errors in spelling, grammar or syntax. Pursuant to the emergency declaration under the [facility], [facility] waiver authority and the [employer] Act, this Virtual Visit was conducted, with patient's consent, to reduce the patient's risk of exposure to COVID-19 and provide continuity of care for an established patient. Services were provided through a video synchronous discussion virtually to substitute for in-person clinic visit. During the visit, the provider was in his office at [facility] in [place], [place] while the patient was at home.

## 2020-05-28 ENCOUNTER — TELEPHONE (OUTPATIENT)
Dept: SURGERY | Age: 60
End: 2020-05-28

## 2020-06-03 ENCOUNTER — HOSPITAL ENCOUNTER (OUTPATIENT)
Dept: MRI IMAGING | Age: 60
Discharge: HOME | End: 2020-06-05
Payer: COMMERCIAL

## 2020-06-03 ENCOUNTER — HOSPITAL ENCOUNTER (OUTPATIENT)
Dept: GENERAL RADIOLOGY | Age: 60
Discharge: HOME | End: 2020-06-05
Payer: COMMERCIAL

## 2020-06-03 ENCOUNTER — TELEPHONE (OUTPATIENT)
Dept: INTERNAL MEDICINE | Age: 60
End: 2020-06-03

## 2020-06-03 PROCEDURE — 72148 MRI LUMBAR SPINE W/O DYE: CPT

## 2020-06-03 PROCEDURE — 73502 X-RAY EXAM HIP UNI 2-3 VIEWS: CPT

## 2020-06-08 ENCOUNTER — TELEPHONE (OUTPATIENT)
Dept: OBGYN | Age: 60
End: 2020-06-08

## 2020-06-08 NOTE — TELEPHONE ENCOUNTER
Spoke with patient and reminded of overdue mammogram.  Patient wants to wait a few months due to the COVID order extended.

## 2020-06-15 ENCOUNTER — OFFICE VISIT (OUTPATIENT)
Dept: PAIN MANAGEMENT | Age: 60
End: 2020-06-15
Payer: COMMERCIAL

## 2020-06-15 VITALS
HEIGHT: 69 IN | RESPIRATION RATE: 16 BRPM | DIASTOLIC BLOOD PRESSURE: 60 MMHG | SYSTOLIC BLOOD PRESSURE: 94 MMHG | WEIGHT: 174.2 LBS | BODY MASS INDEX: 25.8 KG/M2

## 2020-06-15 PROBLEM — M48.061 NEURAL FORAMINAL STENOSIS OF LUMBAR SPINE: Status: ACTIVE | Noted: 2020-06-15

## 2020-06-15 PROBLEM — M54.16 LUMBAR RADICULOPATHY: Status: ACTIVE | Noted: 2020-06-15

## 2020-06-15 PROBLEM — M47.816 LUMBAR FACET JOINT SYNDROME: Status: ACTIVE | Noted: 2020-06-15

## 2020-06-15 PROCEDURE — 99214 OFFICE O/P EST MOD 30 MIN: CPT | Performed by: NURSE PRACTITIONER

## 2020-06-15 ASSESSMENT — ENCOUNTER SYMPTOMS: BACK PAIN: 1

## 2020-06-16 ENCOUNTER — HOSPITAL ENCOUNTER (OUTPATIENT)
Dept: PHYSICAL THERAPY | Age: 60
Setting detail: THERAPIES SERIES
Discharge: HOME | End: 2020-06-16
Payer: COMMERCIAL

## 2020-06-16 PROCEDURE — 97110 THERAPEUTIC EXERCISES: CPT | Performed by: PHYSICAL THERAPIST

## 2020-06-16 PROCEDURE — 97161 PT EVAL LOW COMPLEX 20 MIN: CPT | Performed by: PHYSICAL THERAPIST

## 2020-06-16 ASSESSMENT — PAIN SCALES - GENERAL: PAINLEVEL_OUTOF10: 9

## 2020-06-16 ASSESSMENT — PAIN DESCRIPTION - FREQUENCY: FREQUENCY: INTERMITTENT

## 2020-06-16 ASSESSMENT — PAIN - FUNCTIONAL ASSESSMENT: PAIN_FUNCTIONAL_ASSESSMENT: PREVENTS OR INTERFERES WITH MANY ACTIVE NOT PASSIVE ACTIVITIES

## 2020-06-16 ASSESSMENT — PAIN DESCRIPTION - ORIENTATION: ORIENTATION: LEFT

## 2020-06-16 ASSESSMENT — PAIN DESCRIPTION - PROGRESSION: CLINICAL_PROGRESSION: GRADUALLY WORSENING

## 2020-06-16 ASSESSMENT — PAIN DESCRIPTION - PAIN TYPE: TYPE: ACUTE PAIN

## 2020-06-16 ASSESSMENT — PAIN DESCRIPTION - LOCATION: LOCATION: BACK;BUTTOCKS;LEG

## 2020-06-16 ASSESSMENT — PAIN DESCRIPTION - ONSET: ONSET: PROGRESSIVE

## 2020-06-16 NOTE — PLAN OF CARE
Poor     Electronically signed by:  Zachary Alvarado PT    If you have any questions or concerns, please don't hesitate to call.   Thank you for your referral.      Physician Signature:________________________________Date:__________________  By signing above, therapists plan is approved by physician

## 2020-06-16 NOTE — FLOWSHEET NOTE
Physical Therapy Daily Treatment Note    Date:  2020    Patient Name:  Virgil Soriano    :  1960  MRN: [id number]  Restrictions/Precautions:     Medical/Treatment Diagnosis Information:   · Diagnosis: M54.16 lumbar radiculopathy. · Treatment Diagnosis: M54.16 L lumbar radiculopathy,   Insurance/Certification information:  PT Insurance Information: [employer]  Physician Information:  Referring Practitioner: Owen Reyes CNP  Plan of care signed (Y/N):  n  Visit# / total visits: 1 /10  Pain level: 8/10     Time In:2:00   Time Out:2:40    Progress Note: [x]  Yes  []  No  Next due by: Visit #10, or 7/15/20      Subjective:   See eval    Objective: See eval  Observation:   Test measurements:      Exercises: there ex for lumbar ROM, reduction of post derangement  Exercise/Equipment Resistance/Repetitions Other comments   Lay prone, L side glide 3'    Prone on elbows 3'    Press up 10x x2    B PKF 10x    Hip extend in prone     Modified extension 10x x2    Back bend 10x         TM retro     Lumbar roll  3'         L sciatic n floss               [x] Provided verbal/tactile cueing for activities related to strengthening, flexibility, endurance, ROM. (19601)  [] Provided verbal/tactile cueing for activities related to improving balance, coordination, kinesthetic sense, posture, motor skill, proprioception. (36377)    Therapeutic Activities:     [] Therapeutic activities, direct (one-on-one) patient contact (use of dynamic activities to improve functional performance). (02128)    Gait:   [] Provided training and instruction to the patient for ambulation re-education. (46344)    Self-Care/ADL's  [] Self-care/home management training and compensatory training, meal preparation, safety procedures, and instructions in use of assistive technology devices/adaptive equipment, direct one-on-one contact.  (25565)    Home Exercise Program: Lumbar extension progression for post derangement    [x]

## 2020-06-18 ENCOUNTER — HOSPITAL ENCOUNTER (OUTPATIENT)
Dept: PHYSICAL THERAPY | Age: 60
Setting detail: THERAPIES SERIES
Discharge: HOME | End: 2020-06-18
Payer: COMMERCIAL

## 2020-06-18 PROCEDURE — G0283 ELEC STIM OTHER THAN WOUND: HCPCS

## 2020-06-18 PROCEDURE — 97110 THERAPEUTIC EXERCISES: CPT

## 2020-06-23 ENCOUNTER — HOSPITAL ENCOUNTER (OUTPATIENT)
Dept: PHYSICAL THERAPY | Age: 60
Setting detail: THERAPIES SERIES
Discharge: HOME | End: 2020-06-23
Payer: COMMERCIAL

## 2020-06-23 PROCEDURE — G0283 ELEC STIM OTHER THAN WOUND: HCPCS

## 2020-06-23 PROCEDURE — 97110 THERAPEUTIC EXERCISES: CPT

## 2020-06-23 NOTE — FLOWSHEET NOTE
Physical Therapy Daily Treatment Note    Date:  2020    Patient Name:  Sudhakar Nichols    :  1960  MRN: [id number]  Restrictions/Precautions:     Medical/Treatment Diagnosis Information:   · Diagnosis: M54.16 lumbar radiculopathy. · Treatment Diagnosis: M54.16 L lumbar radiculopathy,   Insurance/Certification information:  PT Insurance Information: [employer]  Physician Information:  Referring Practitioner: Hermelindo Alexandra CNP  Plan of care signed (Y/N):  Y   Visit# / total visits: 3/10  Pain level: 3/10     Time In: 2:04   Time Out:2:48    Progress Note: []  Yes  [x]  No  Next due by: Visit #10, or 7/15/20      Subjective:Pt reports LB back with radicular symptoms to front of R thigh. Reports the pain has switched from the L side to the R. Reports completing prone lying yesterday and was not able to move into prone prop. Objective: there ex for lumbar ROM, reduction of post derangement. Reports sharp shooting pain in R thigh with prone lying. Observation: decreased core strength/stabilization   Test measurements:      Exercises:   Exercise/Equipment Resistance/Repetitions Other comments   Lay prone, L side glide 3'    Prone on elbows 3'    Press up 10x x3    B PKF 10x x3     Hip extend in prone 10x     Modified extension 15x     Back bend 15x         Bridges  10x  FT, FA    Hip ext/abd           TM retro     Lumbar roll           L sciatic n floss               [x] Provided verbal/tactile cueing for activities related to strengthening, flexibility, endurance, ROM. (69018)  [] Provided verbal/tactile cueing for activities related to improving balance, coordination, kinesthetic sense, posture, motor skill, proprioception. (22459)    Therapeutic Activities:     [] Therapeutic activities, direct (one-on-one) patient contact (use of dynamic activities to improve functional performance). (11779)    Gait:   [] Provided training and instruction to the patient for ambulation re-education.
controlled          Plan:   [x] Continue per plan of care [] Alter current plan (see comments)  [] Plan of care initiated [] Hold pending MD visit [] Discharge  Plan for Next Session: Progress per patient tolerance.  Focus on core and lumbar strength/stabilization     Electronically signed by:  Elba Barakat

## 2020-06-25 ENCOUNTER — HOSPITAL ENCOUNTER (OUTPATIENT)
Dept: PHYSICAL THERAPY | Age: 60
Setting detail: THERAPIES SERIES
Discharge: HOME | End: 2020-06-25
Payer: COMMERCIAL

## 2020-06-25 PROCEDURE — 97110 THERAPEUTIC EXERCISES: CPT | Performed by: PHYSICAL THERAPY ASSISTANT

## 2020-06-25 NOTE — FLOWSHEET NOTE
Physical Therapy Daily Treatment Note    Date:  2020    Patient Name:  Tanisha Miranda    :  1960  MRN: [id number]  Restrictions/Precautions:     Medical/Treatment Diagnosis Information:   · Diagnosis: M54.16 lumbar radiculopathy. · Treatment Diagnosis: M54.16 L lumbar radiculopathy,   Insurance/Certification information:  PT Insurance Information: [employer]  Physician Information:  Referring Practitioner: Isamar Matos CNP  Plan of care signed (Y/N):  Y   Visit# / total visits: 4/10  Pain level: 0/10    5/10 at worst varies between R/L radicular     Time In: 957   Time [id number]    HX of Skin Cancer    Progress Note: []  Yes  [x]  No  Next due by: Visit #10, or 7/15/20      Subjective: Pt. Relates pain this date rated 5/10 intermittenly    Objective: SRI complete per flow chart to facilitate strength, motion and stability to allow ease with daily activities and ADL's. Verbal cuing for progression, technique and order for exercises. No increase in pain noted at conclusion of session. Added and advanced several strengthening and stability exercises. Observation: decreased core strength/stabilization   Limited lumbar extension ROM.    Test measurements:      Exercises:   Exercise/Equipment Resistance/Repetitions Other comments   Lay prone 5'    Prone on elbows 3'    Press up 10x x3    B PKF 10x x3     Hip extend in prone 10x     Modified extension 15x     Back bend 15x         Bridges  10x  FT, FA    Hip ext/abd  10x 2 sets Initiated   Rows/Ext 10x ea Staggered stance                  TM retro 5' 1.8 MPH   Lumbar roll  Reviewed         L sciatic n floss               [x] Provided verbal/tactile cueing for activities related to strengthening, flexibility, endurance, ROM. (79200)  [] Provided verbal/tactile cueing for activities related to improving balance, coordination, kinesthetic sense, posture, motor skill, proprioception. (82388)    Therapeutic Activities:     [] Therapeutic Hold pending MD visit [] Discharge      Plan for Next Session: Progress per patient tolerance. Focus on core and lumbar strength/stabilization     Electronically signed by:   Domitila Phelps

## 2020-06-26 NOTE — PROGRESS NOTES
I have reviewed and agree to the content of the note written by the PTA.   Electronically signed by Sarita Miguel PT [id number]

## 2020-06-29 ENCOUNTER — HOSPITAL ENCOUNTER (OUTPATIENT)
Dept: PHYSICAL THERAPY | Age: 60
Setting detail: THERAPIES SERIES
Discharge: HOME | End: 2020-06-29
Payer: COMMERCIAL

## 2020-06-29 PROCEDURE — 97110 THERAPEUTIC EXERCISES: CPT

## 2020-06-29 NOTE — FLOWSHEET NOTE
Physical Therapy Daily Treatment Note    Date:  2020    Patient Name:  Mychal Aguila    :  1960  MRN: [id number]  Restrictions/Precautions:     Medical/Treatment Diagnosis Information:   · Diagnosis: M54.16 lumbar radiculopathy. · Treatment Diagnosis: M54.16 L lumbar radiculopathy,   Insurance/Certification information:  PT Insurance Information: [employer]  Physician Information:  Referring Practitioner: Tona Sadler CNP  Plan of care signed (Y/N):  Y   Visit# / total visits: 4/10  Pain level: 0/10    5/10 at worst varies between R/L radicular     Time In: 1:57   Time Out 2:36    HX of Skin Cancer    Progress Note: []  Yes  [x]  No  Next due by: Visit #10, or 7/15/20      Subjective: Pt reports pain in the L hip when seated. Reports she is able to sleep all night if positioned properly with no disturbance. Objective: SRI complete per flow chart to facilitate strength, motion and stability to allow ease with daily activities and ADL's. Verbal cuing for progression, technique and order for exercises. No increase in pain noted at conclusion of session. Decreased balance noted with quad. Alt UE/LE          Observation: decreased core strength/stabilization   Limited lumbar extension ROM.    Test measurements:      Exercises:   Exercise/Equipment Resistance/Repetitions Other comments   Lay prone 5'    Prone on elbows 3'    Press up 10x x3    B PKF 10x x3     Hip extend in prone 15x     Modified extension 15x     Back bend 15x         Bridges  15x  FT, FA    Hip ext/abd  10x 2 sets Initiated   Rows/Ext 15x ea Staggered stance        Arch/Sag  10x5\"    Quad alt UE/LE  10x                    TM retro 5' 1.8 MPH   Lumbar roll  Reviewed         L sciatic n floss               [x] Provided verbal/tactile cueing for activities related to strengthening, flexibility, endurance, ROM. (72067)  [] Provided verbal/tactile cueing for activities related to improving balance, coordination, kinesthetic sense,

## 2020-07-01 ENCOUNTER — HOSPITAL ENCOUNTER (OUTPATIENT)
Dept: PHYSICAL THERAPY | Age: 60
Setting detail: THERAPIES SERIES
Discharge: HOME | End: 2020-07-01
Payer: COMMERCIAL

## 2020-07-01 PROCEDURE — 97140 MANUAL THERAPY 1/> REGIONS: CPT

## 2020-07-01 PROCEDURE — 97110 THERAPEUTIC EXERCISES: CPT

## 2020-07-01 NOTE — FLOWSHEET NOTE
verbal/tactile cueing for activities related to improving balance, coordination, kinesthetic sense, posture, motor skill, proprioception. (69269)    Therapeutic Activities:     [] Therapeutic activities, direct (one-on-one) patient contact (use of dynamic activities to improve functional performance). (48015)    Gait:   [] Provided training and instruction to the patient for ambulation re-education. (67228)    Self-Care/ADL's  [] Self-care/home management training and compensatory training, meal preparation, safety procedures, and instructions in use of assistive technology devices/adaptive equipment, direct one-on-one contact. (81628)    Home Exercise Program: Lumbar extension progression for post derangement    [x] Reviewed/Progressed HEP activities related to strengthening, flexibility, endurance, ROM. (24361)  [] Reviewed/Progressed HEP activities related to improving balance, coordination, kinesthetic sense, posture, motor skill, proprioception.  (74627)    Manual Treatments:    [] Provided manual therapy to mobilize soft tissue/joints for the purpose of modulating pain, promoting relaxation,  increasing ROM, reducing/eliminating soft tissue swelling/inflammation/restriction, improving soft tissue extensibility.  (43602)    Service Based Modalities:       Timed Code Treatment Minutes:   8' man, 28' ex  Total Treatment Minutes: 39'     Treatment/Activity Tolerance:  [x] Patient tolerated treatment well [] Patient limited by fatique  [] Patient limited by pain  [] Patient limited by other medical complications  [] Other:     Prognosis: [x] Good [] Fair  [] Poor    Patient Requires Follow-up: [x] Yes  [] No      Goals:  Short term goals  Time Frame for Short term goals: 1 week  Short term goal 1: Start HEP (initiated at [facility])    Long term goals  Time Frame for Long term goals : 4 weeks  Long term goal 1: Pain controlled 1-2/10, with L sciatica resolved 90%  Long term goal 2: Able to resume 2 mile wellness walking Long term goal 3: Able to sit 45 min    Long term goal 4: Standing 60 min with pain controlled      Plan:   [x] Continue per plan of care [] Alter current plan (see comments)  [] Plan of care initiated [] Hold pending MD visit [] Discharge      Plan for Next Session: Progress per patient tolerance. Focus on core and lumbar strength/stabilization.  Recheck pelvis      Electronically signed by:  Shayla Mcgee

## 2020-07-07 ENCOUNTER — HOSPITAL ENCOUNTER (OUTPATIENT)
Dept: PHYSICAL THERAPY | Age: 60
Setting detail: THERAPIES SERIES
Discharge: HOME | End: 2020-07-07
Payer: COMMERCIAL

## 2020-07-07 PROCEDURE — 97110 THERAPEUTIC EXERCISES: CPT

## 2020-07-07 NOTE — FLOWSHEET NOTE
Physical Therapy Daily Treatment Note    Date:  2020    Patient Name:  Huang Pastor    :  1960  MRN: [id number]  Restrictions/Precautions:     Medical/Treatment Diagnosis Information:   · Diagnosis: M54.16 lumbar radiculopathy. · Treatment Diagnosis: M54.16 L lumbar radiculopathy,   Insurance/Certification information:  PT Insurance Information: [employer]  Physician Information:  Referring Practitioner: Alessandro Pineda CNP  Plan of care signed (Y/N):  Y   Visit# / total visits: 8/10  Pain level:     5/10 at worst varies between R/L radicular     Time In: 2:32   Time Out 3:18     HX of Skin Cancer    Progress Note: []  Yes  [x]  No  Next due by: Visit #10, or 7/15/20      Subjective:  Pt rpts to clinic with mild complaints of LBP stating \"I have some pain that wraps around my R thigh but its not bad\". Objective: Pt tolerated todays session well indicating no increase in pain post session. Pt was able to progress multiple exercises with good tolerance and minimal vc required. No rest breaks needed and able to exhibit improved lumbar extension throughout session. No MET correction required this date as pt arrived with proper pelvic alignment. Observation: decreased core stren5'gth/stabilization   Limited lumbar extension ROM.    Test measurements:      Exercises:   Exercise/Equipment Resistance/Repetitions Other comments   Lay prone 5'    Prone on elbows 3'    Press up 10x x3    B PKF 10x x3     Hip extend in prone 15x x 2     Modified extension 15x     Back bend 15x    LTR's 10x5\"    Bridges  15x x 2  FT, FA    Hip ext/abd  10x 2 sets Initiated   Rows/Ext 15x ea vivian Staggered stance        Arch/Sag     Quad alt UE/LE                 TM retro 5'1.8 MPH   Lumbar roll  Reviewed         L sciatic n floss     Met to pelvis           [x] Provided verbal/tactile cueing for activities related to strengthening, flexibility, endurance, ROM. (78800)  [] Provided verbal/tactile cueing for activities related to improving balance, coordination, kinesthetic sense, posture, motor skill, proprioception. (42465)    Therapeutic Activities:     [] Therapeutic activities, direct (one-on-one) patient contact (use of dynamic activities to improve functional performance). (29537)    Gait:   [] Provided training and instruction to the patient for ambulation re-education. (12927)    Self-Care/ADL's  [] Self-care/home management training and compensatory training, meal preparation, safety procedures, and instructions in use of assistive technology devices/adaptive equipment, direct one-on-one contact. (91102)    Home Exercise Program: Lumbar extension progression for post derangement    [x] Reviewed/Progressed HEP activities related to strengthening, flexibility, endurance, ROM. (41670)  [] Reviewed/Progressed HEP activities related to improving balance, coordination, kinesthetic sense, posture, motor skill, proprioception.  (68428)    Manual Treatments:    [] Provided manual therapy to mobilize soft tissue/joints for the purpose of modulating pain, promoting relaxation,  increasing ROM, reducing/eliminating soft tissue swelling/inflammation/restriction, improving soft tissue extensibility.  (75587)    Service Based Modalities:       Timed Code Treatment Minutes:   55' therex    Total Treatment Minutes: 55'      Treatment/Activity Tolerance:  [x] Patient tolerated treatment well [] Patient limited by fatique  [] Patient limited by pain  [] Patient limited by other medical complications  [] Other:     Prognosis: [x] Good [] Fair  [] Poor    Patient Requires Follow-up: [x] Yes  [] No      Goals:  Short term goals  Time Frame for Short term goals: 1 week  Short term goal 1: Start HEP (initiated at [facility])    Long term goals  Time Frame for Long term goals : 4 weeks  Long term goal 1: Pain controlled 1-2/10, with L sciatica resolved 90%  Long term goal 2: Able to resume 2 mile wellness walking   Long term goal 3: Able to sit 45 min Long term goal 4: Standing 60 min with pain controlled      Plan:   [x] Continue per plan of care [] Alter current plan (see comments)  [] Plan of care initiated [] Hold pending MD visit [] Discharge      Plan for Next Session: Progress per patient tolerance. Focus on core and lumbar strength/stabilization.  Recheck pelvis       Electronically signed by:  Lyubov Casarez PTA

## 2020-07-08 ENCOUNTER — HOSPITAL ENCOUNTER (OUTPATIENT)
Dept: PHYSICAL THERAPY | Age: 60
Setting detail: THERAPIES SERIES
Discharge: HOME | End: 2020-07-08
Payer: COMMERCIAL

## 2020-07-08 PROCEDURE — 97110 THERAPEUTIC EXERCISES: CPT

## 2020-07-08 NOTE — FLOWSHEET NOTE
Physical Therapy Daily Treatment Note    Date:  2020    Patient Name:  Adis Ruiz    :  1960  MRN: [id number]  Restrictions/Precautions:     Medical/Treatment Diagnosis Information:   · Diagnosis: M54.16 lumbar radiculopathy. · Treatment Diagnosis: M54.16 L lumbar radiculopathy,   Insurance/Certification information:  PT Insurance Information: [employer]  Physician Information:  Referring Practitioner: Ifeanyi Rodgers CNP  Plan of care signed (Y/N):  Y   Visit# / total visits: 9/10  Pain level:     5/10 at worst varies between R/L radicular     Time In:8:01   Time Out:  8:47      HX of Skin Cancer    Progress Note: []  Yes  [x]  No  Next due by: Visit #10, or 7/15/20      Subjective:  Pt rpts mild aching in the L glute. Objective: Pt tolerated todays session well indicating no increase in pain post session. No rest breaks needed and able to exhibit improved lumbar extension throughout session. No MET correction required this date as pt arrived with proper pelvic alignment. Observation: decreased core stren5'gth/stabilization   Limited lumbar extension ROM. Test measurements:      Exercises:   Exercise/Equipment Resistance/Repetitions Other comments   Lay prone 5'    Prone on elbows 3'    Press up 10x x3    B PKF 10x x3     Hip extend in prone 15x x 2     Modified extension 15x     Back bend 15x    LTR's 10x5\"    Bridges  15x x 2  FT, FA    Hip ext/abd  10x 2 sets Initiated   Rows/Ext 15x ea vivian Staggered stance        Arch/Sag     Quad alt UE/LE                 TM retro 5'2. 0 MPH   Lumbar roll  Reviewed         L sciatic n floss     Met to pelvis           [x] Provided verbal/tactile cueing for activities related to strengthening, flexibility, endurance, ROM. (13838)  [] Provided verbal/tactile cueing for activities related to improving balance, coordination, kinesthetic sense, posture, motor skill, proprioception. (41146)    Therapeutic Activities:     [] Therapeutic activities,

## 2020-07-09 ENCOUNTER — OFFICE VISIT (OUTPATIENT)
Dept: PAIN MANAGEMENT | Age: 60
End: 2020-07-09
Payer: COMMERCIAL

## 2020-07-09 VITALS
BODY MASS INDEX: 25.62 KG/M2 | DIASTOLIC BLOOD PRESSURE: 72 MMHG | HEART RATE: 74 BPM | WEIGHT: 173 LBS | HEIGHT: 69 IN | SYSTOLIC BLOOD PRESSURE: 118 MMHG

## 2020-07-09 PROBLEM — M48.061 SPINAL STENOSIS OF LUMBAR REGION WITHOUT NEUROGENIC CLAUDICATION: Status: ACTIVE | Noted: 2020-07-09

## 2020-07-09 PROCEDURE — 99214 OFFICE O/P EST MOD 30 MIN: CPT | Performed by: NURSE PRACTITIONER

## 2020-07-09 ASSESSMENT — ENCOUNTER SYMPTOMS: BACK PAIN: 1

## 2020-07-09 NOTE — PROGRESS NOTES
differently: Take 12.5 mg by mouth daily ) 90 tablet 1    Naproxen Sodium 220 MG CAPS Take 220 mg by mouth as needed 1 tablets every 12 hours      ascorbic acid (VITAMIN C) 500 MG tablet Take 500 mg by mouth daily as needed       Biotin 5000 MCG CAPS Take 2,500 mcg by mouth daily       magnesium (MAGNESIUM-OXIDE) 250 MG TABS tablet Take 500 mg by mouth nightly. Past Medical History:   Diagnosis Date    Anxiety and depression     Treated long term with SSRI.  Arthritis     Basal cell carcinoma     History of, abdomen.  Benign paroxysmal positional vertigo     History of.  Carpal tunnel syndrome     History of, EMG 12/09, with mild carpal tunnel syndrome bilaterally.  Dyslipidemia     10 year risk 1.6% 2/17    Hypomagnesemia     History of.  Hypothyroidism     Interstitial cystitis 05/07    History of interstitial cystitis with biopsy, noting interstitial cystitis.  Low magnesium levels     takes magnesium pills    Migraines     History of.    Mitral valve prolapse     Trivial mitral regurgitation on echo, 2002.  Neck pain     History of chronic neck and back pain, stable.  OA (osteoarthritis)     Osteopenia     Dexa 2016 Frax .3% 10 yr risk, DEXA 5/18 FRAX .5% 10 yr risk for HIP fracture    PVC's (premature ventricular contractions)     PACs treated with beta blockers.  Rosacea        Past Surgical History:   Procedure Laterality Date    COLONOSCOPY  09/12/02    COLONOSCOPY  12-30-13    normal cs, repeat 5 yrs family hx colon cancer    CYSTOSCOPY  05/11/07    DILATION AND CURETTAGE OF UTERUS  09/27/01    Hysteroscopy.  ELBOW SURGERY      Right elbow epicondylitis surgery in October 2010 with Dr. Alma Rosa Cabrera.  NOSE SURGERY  04/19/05    Status post fracture with turbinectomy, polyp removed and septoplasty.  NOSE SURGERY  3/18/2005    septorhinoplasty    OTHER SURGICAL HISTORY      TVT. Anterior repair for cystocele done in her 35s.      PARTIAL HYSTERECTOMY  12/18/01    Supracervical.  Ovaries remain    SKIN CANCER EXCISION      Basal cell carcinoma removed from the abdomen in the past.         Family History   Problem Relation Age of Onset    Cancer Father         Bowel, rectal, prostate and skin    Colon Cancer Father     Other Brother         Pneumonia    Heart Attack Mother     High Blood Pressure Mother     Kidney Disease Mother     Thyroid Disease Mother     Breast Cancer Maternal Aunt     Colon Cancer Paternal Aunt     Liver Cancer Paternal Grandmother     Colon Cancer Paternal Aunt     Colon Cancer Sister     Colon Cancer Brother     Colon Cancer Paternal Aunt     Colon Cancer Paternal Aunt     Colon Cancer Paternal Uncle     Colon Cancer Paternal Uncle        Social History     Socioeconomic History    Marital status:      Spouse name: None    Number of children: None    Years of education: None    Highest education level: None   Occupational History    None   Social Needs    Financial resource strain: None    Food insecurity     Worry: None     Inability: None    Transportation needs     Medical: None     Non-medical: None   Tobacco Use    Smoking status: Never Smoker    Smokeless tobacco: Never Used   Substance and Sexual Activity    Alcohol use: Not Currently     Alcohol/week: 0.0 standard drinks     Comment: Rarely.     Drug use: No    Sexual activity: None   Lifestyle    Physical activity     Days per week: None     Minutes per session: None    Stress: None   Relationships    Social connections     Talks on phone: None     Gets together: None     Attends [employer] service: None     Active member of club or organization: None     Attends meetings of clubs or organizations: None     Relationship status: None    Intimate partner violence     Fear of current or ex partner: None     Emotionally abused: None     Physically abused: None     Forced sexual activity: None   Other Topics Concern    None   Social History Narrative    None     Review of Systems   Constitutional: Positive for activity change (decreased) and unexpected weight change (weight gain due to decrease activity). Musculoskeletal: Positive for arthralgias, back pain and myalgias. Psychiatric/Behavioral: Positive for sleep disturbance. Objective:   Physical Exam  Vitals signs reviewed. Constitutional:       General: She is not in acute distress. Appearance: She is well-developed. Interventions: She is not intubated. HENT:      Head: Normocephalic and atraumatic. Pulmonary:      Effort: Pulmonary effort is normal. No tachypnea, bradypnea, accessory muscle usage, prolonged expiration, respiratory distress or retractions. She is not intubated. Musculoskeletal:      Lumbar back: She exhibits bony tenderness (right lumbar facet). Comments: MRI OF THE LUMBAR SPINE WITHOUT CONTRAST, 6/3/2020 10:20 am     TECHNIQUE:  Multiplanar multisequence MRI of the lumbar spine was performed without the  administration of intravenous contrast.     COMPARISON:  None.     HISTORY:  ORDERING SYSTEM PROVIDED HISTORY: Chronic low back pain, unspecified back  pain laterality, unspecified whether sciatica present  TECHNOLOGIST PROVIDED HISTORY:  Reason for Exam: Low back pain, bilateral hip pain and left leg numbness to  the knee. History of skin caner.   Acuity: Chronic  Type of Exam: Unknown  Additional signs and symptoms: Chronic low back pain, unspecified back pain  laterality, unspecified whether sciatica present.     FINDINGS:  BONES/ALIGNMENT: The vertebral body heights are maintained.  There is  age-appropriate bone marrow signal.  There is degenerative endplate change at  the L4-5 level.  There is multilevel degenerative disc disease with loss of  disc signal.  There is mild disc space narrowing most pronounced in the lower  lumbar spine.  There is no spondylolisthesis.     SPINAL CORD: The conus medullaris is normal in caliber and signal and  terminates at the L1-2 level.  The cauda equina is unremarkable.     SOFT TISSUES: Posterior paraspinal soft tissues are unremarkable.  The  visualized abdominal structures are unremarkable.     L1-L2: There is a circumferential disc bulge with facet hypertrophy.  There  is no canal stenosis or foraminal narrowing.     L2-L3: There is a circumferential disc bulge with a superimposed left  foraminal disc protrusion.  There is no canal stenosis or foraminal narrowing.     L3-L4: There is a circumferential disc bulge with facet hypertrophy.  There  is no canal stenosis or foraminal narrowing.     L4-L5: There is a circumferential disc bulge with facet and ligamentous  hypertrophy.  There is canal stenosis measuring 8 mm in AP dimension.  There  is moderate bilateral foraminal narrowing.     L5-S1: There is a circumferential disc bulge with facet hypertrophy.  There  is no canal stenosis.  There is mild left and moderate right foraminal  narrowing.        Impression  Multilevel degenerative disc disease with associated multilevel degenerative  facet hypertrophy with canal stenosis at L4-5.     Moderate bilateral foraminal narrowing at L4-5 as well as mild left and  moderate right foraminal narrowing at L5-S1. Skin:     General: Skin is warm and dry. Capillary Refill: Capillary refill takes less than 2 seconds. Nails: There is no clubbing. Neurological:      Mental Status: She is alert and oriented to person, place, and time. GCS: GCS eye subscore is 4. GCS verbal subscore is 5. GCS motor subscore is 6. Cranial Nerves: No cranial nerve deficit. Comments: Negative SLR   Psychiatric:         Speech: Speech normal.         Behavior: Behavior is cooperative. Assessment:       Diagnosis Orders   1. Neural foraminal stenosis of lumbar spine  External Referral To Physical Therapy   2. Lumbar facet joint syndrome  External Referral To Physical Therapy   3.  Lumbar radiculopathy

## 2020-07-09 NOTE — PATIENT INSTRUCTIONS
[facility]  [address]  Telephone [phone]  Fax [phone]      PROCEDURE INSTRUCTIONS FOR  PAIN MANAGEMENT PROCEDURES WITHOUT IV SEDATION    Celina Shearer scheduled to see Dr. Shayla Carter to undergo the following procedure:  L4/L5 and L5/S1 Facet Joint Injection    Procedure Date: ____7/28/2020____  You will receive a call to schedule Covid testing  You will receive a call the day prior to your procedure to confirm a time. Report to the [place] [id number], Registration office on the 1st floor in the hospital, after check in and signing of paper work you will then go to the second floor to the surgery center. 1. Stop the following medications prior to the procedure:    NONE    2. Take all routine medications unless otherwise instructed. Ok to take vitamins and antiinflammatory medications    3. EATING & DRINKING:  Ok to eat or drink before the injection - no IV sedation will be used. 4. If you are allergic to contrast or iodine, you must take benadryl and prednisone prior to the injection to prevent an allergic reaction. Follow the directions on the prescription for the times to take the medication. 5. Oral valium can be prescribed if your are anxious about the injections or feel that you can not lay still during the injection. If you take valium, you must have a . Make arrangements for a family member or friend to drive you to the surgery center. Your ride must stay in the hospital while you are having the injection done. If they cannot stay, the injection will be rescheduled. The valium may affect your judgment following the procedure and driving a vehicle within 24 hours after the sedation could be dangerous. 6.  Wear simple loose clothing, which can be easily changed. 7.  Leave jewelry (including rings) and other valuables at home.      8.  You will be asked to sign several forms prior to surgery; patients under the age of 25 must have a parent or legal guardian sign the permit to be able to do the procedure. 9.  You must have finished any antibiotic prescribed for recent infections. If required, please take pre-procedure antibiotic or other pre-procedure medications as instructed. 10. Bring inhalers and pain medications with you to your procedure. 11. Bring your MRI/CT films if they were done outside of the [facility]. 12. If you should develop a cold, sore throat, cough, fever or other new indication of illness or infection, or are started on antibiotics within 2 weeks of the scheduled procedure, please notify the [facility] office as early as possible at [phone]. If calling after 4:30pm the day prior to your scheduled procedure please contact [phone] and Leave a Voice Message.

## 2020-07-16 ENCOUNTER — HOSPITAL ENCOUNTER (OUTPATIENT)
Dept: PHYSICAL THERAPY | Age: 60
Setting detail: THERAPIES SERIES
Discharge: HOME | End: 2020-07-16
Payer: COMMERCIAL

## 2020-07-16 PROCEDURE — 97110 THERAPEUTIC EXERCISES: CPT

## 2020-07-16 NOTE — FLOWSHEET NOTE
Physical Therapy Daily Treatment Note    Date:  2020    Patient Name:  Lydia Vann    :  1960  MRN: [id number]  Restrictions/Precautions:     Medical/Treatment Diagnosis Information:   · Diagnosis: M54.16 lumbar radiculopathy. · Treatment Diagnosis: M54.16 L lumbar radiculopathy,   Insurance/Certification information:  PT Insurance Information: [employer]  Physician Information:  Referring Practitioner: Lalito Pichardo CNP  Plan of care signed (Y/N):  Y   Visit# / total visits: 10/10  Pain level:     0/10 at worst varies between R/L radicular     Time In: 9:00   Time Out:   9:47    HX of Skin Cancer    Progress Note: []  Yes  [x]  No  Next due by: Visit #10, or 7/15/20      Subjective: Pt rpts to clinic with no current complaints of pain stating \"I was able to complete my 2 mile walk the other day with really little pain. I think I am able to do this stuff at home and can be done today\". Objective: Pt tolerated todays session well indicating no increase in pain and centralized mild symptoms than can be manipulated with position changes. Pt was able to initiate L roadkill position with good tolerance and noted decreased pain. Still did not meet standing goal but notes being able to perform functionally required activities at home. Instructed to continue HEP after d/c this date with understanding noted. Observation: Proper pelvic alignment this date. Limited lumbar extension ROM. Test measurements:      Exercises:   Exercise/Equipment Resistance/Repetitions Other comments   Lay prone 5'    Prone on elbows 3'    Press up 10x x3    B PKF 10x x3     Hip extend in prone 15x x 2     Modified extension 15x     Back bend 15x    LTR's 10x5\"    Bridges  15x x 2  FT, FA    Hip ext/abd  10x 2 sets Initiated   Rows/Ext 15x ea vivian Staggered stance   T-band SPO's x15 grn    Arch/Sag     Quad alt UE/LE                 TM retro 5'2. 0 MPH   Lumbar roll  Reviewed         L sciatic n floss     Met to pelvis           [x] Provided verbal/tactile cueing for activities related to strengthening, flexibility, endurance, ROM. (55595)  [] Provided verbal/tactile cueing for activities related to improving balance, coordination, kinesthetic sense, posture, motor skill, proprioception. (43514)    Therapeutic Activities:     [] Therapeutic activities, direct (one-on-one) patient contact (use of dynamic activities to improve functional performance). (76493)    Gait:   [] Provided training and instruction to the patient for ambulation re-education. (25460)    Self-Care/ADL's  [] Self-care/home management training and compensatory training, meal preparation, safety procedures, and instructions in use of assistive technology devices/adaptive equipment, direct one-on-one contact. (58973)    Home Exercise Program: Lumbar extension progression for post derangement    [x] Reviewed/Progressed HEP activities related to strengthening, flexibility, endurance, ROM. (10255)  [] Reviewed/Progressed HEP activities related to improving balance, coordination, kinesthetic sense, posture, motor skill, proprioception.  (20225)    Manual Treatments:    [] Provided manual therapy to mobilize soft tissue/joints for the purpose of modulating pain, promoting relaxation,  increasing ROM, reducing/eliminating soft tissue swelling/inflammation/restriction, improving soft tissue extensibility.  (65964)    Service Based Modalities:       Timed Code Treatment Minutes:   52' therex    Total Treatment Minutes: 52'      Treatment/Activity Tolerance:  [x] Patient tolerated treatment well [] Patient limited by fatique  [] Patient limited by pain  [] Patient limited by other medical complications  [] Other:     Prognosis: [x] Good [] Fair  [] Poor    Patient Requires Follow-up: [] Yes  [x] No      Goals:  Short term goals  Time Frame for Short term goals: 1 week  Short term goal 1: Start HEP (initiated at [facility])    Long term goals  Time Frame for Long term goals : 4 weeks  Long term goal 1: Pain controlled 1-2/10, with L sciatica resolved 90% (Met this date but notes increased pain in evening. 16JUL)  Long term goal 2: Able to resume 2 mile wellness walking (Accomplished. 16JUL)   Long term goal 3: Able to sit 45 min  (able to sit 45 minutes, keeps legs moving. 16JUL)   Long term goal 4: Standing 60 min with pain controlled  (Notes tolerating 10-15 min only. 16JUL)     Plan:   [] Continue per plan of care [] Alter current plan (see comments)  [] Plan of care initiated [] Hold pending MD visit [x] Discharge      Plan for Next Session: Pt d/c'd this date.     Electronically signed by:  Brendan Lu PTA

## 2020-07-21 ENCOUNTER — PRE-PROCEDURE TELEPHONE (OUTPATIENT)
Dept: PREADMISSION TESTING | Age: 60
End: 2020-07-21

## 2020-07-21 NOTE — TELEPHONE ENCOUNTER
LM regarding PAT covid test to be done this Friday 7/24/20 at [address] at the [facility]. Phone number left for patient to call back and confirm date, time, and get directions to sleep center if needed.

## 2020-07-30 NOTE — DISCHARGE SUMMARY
[facility]    [x] [place]  Phone: [phone]  Fax: [phone]      [] [place]  Phone: [phone]  Fax: [phone]    Physical Therapy Discharge Note  Date: 2020        Patient Name:  Adis Ruiz    :  1960  MRN: [id number]  Restrictions/Precautions:      Medical/Treatment Diagnosis Information:  ·   Diagnosis: M54.16 lumbar radiculopathy. · Treatment Diagnosis: M54.16 L lumbar radiculopathy,   ·    Insurance/Certification information:    [employer]  Physician Information:     Ifeanyi Rodgers CNP  Plan of care signed (Y/N): y  Visit# / total visits:  10  Pain level: 0/10   Time Period for Report:   Cancels/No-shows to date:      Plan of Care/Treatment to date:  [x] Therapeutic Exercise    [] Modalities:  [] Therapeutic Activity     [] Ultrasound  [x] Electrical Stimulation  [] Gait Training      [] Cervical Traction    [] Lumbar Traction  [] Neuromuscular Re-education  [] Cold/hotpack [] Iontophoresis  [x] Instruction in HEP      Other:  [x] Manual Therapy       []    [] Aquatic Therapy       []                    ? Subjective:     Pt rpts to clinic with no current complaints of pain stating \"I was able to complete my 2 mile walk the other day with really little pain. I think I am able to do this stuff at home and can be done today\".            Objective:   Pain problem resolved            Plan:       d/c    Goals:    Short term goals  Time Frame for Short term goals: 1 week  Short term goal 1: Start HEP -met     Long term goals  Time Frame for Long term goals : 4 weeks  Long term goal 1: Pain controlled 1-2/10, with L sciatica resolved 90% -met  Long term goal 2: Able to resume 2 mile wellness walking -met   Long term goal 3: Able to sit 45 min  -met   Long term goal 4: Standing 60 min with pain controlled -part met          Percentage of Goals Met: 90            Discharge Prognosis: [] Excellent [x] Good [] Fair  [] Poor     Goal Status:  [x] Achieved [] Partially Achieved  [] Not Achieved       Electronically signed by:  Phong Martino PT

## 2020-09-01 RX ORDER — LEVOTHYROXINE SODIUM 0.1 MG/1
TABLET ORAL
Qty: 90 TABLET | Refills: 3 | Status: SHIPPED | OUTPATIENT
Start: 2020-09-01 | End: 2021-08-31

## 2020-09-15 ENCOUNTER — TELEPHONE (OUTPATIENT)
Dept: OBGYN | Age: 60
End: 2020-09-15

## 2020-12-31 ENCOUNTER — TELEPHONE (OUTPATIENT)
Dept: OBGYN | Age: 60
End: 2020-12-31

## 2021-06-14 ENCOUNTER — TELEPHONE (OUTPATIENT)
Dept: INTERNAL MEDICINE | Age: 61
End: 2021-06-14

## 2021-06-14 DIAGNOSIS — Z12.31 ENCOUNTER FOR SCREENING MAMMOGRAM FOR MALIGNANT NEOPLASM OF BREAST: Primary | ICD-10-CM

## 2021-06-14 DIAGNOSIS — M85.80 OSTEOPENIA, UNSPECIFIED LOCATION: ICD-10-CM

## 2021-06-24 ENCOUNTER — OFFICE VISIT (OUTPATIENT)
Dept: INTERNAL MEDICINE | Age: 61
End: 2021-06-24
Payer: COMMERCIAL

## 2021-06-24 VITALS
HEART RATE: 89 BPM | RESPIRATION RATE: 16 BRPM | HEIGHT: 68 IN | OXYGEN SATURATION: 98 % | DIASTOLIC BLOOD PRESSURE: 88 MMHG | SYSTOLIC BLOOD PRESSURE: 128 MMHG | WEIGHT: 170 LBS | BODY MASS INDEX: 25.76 KG/M2

## 2021-06-24 DIAGNOSIS — E78.5 HYPERLIPIDEMIA, UNSPECIFIED HYPERLIPIDEMIA TYPE: ICD-10-CM

## 2021-06-24 DIAGNOSIS — E03.9 HYPOTHYROIDISM, UNSPECIFIED TYPE: Primary | ICD-10-CM

## 2021-06-24 DIAGNOSIS — I49.3 PVC'S (PREMATURE VENTRICULAR CONTRACTIONS): ICD-10-CM

## 2021-06-24 DIAGNOSIS — M25.552 HIP PAIN, CHRONIC, LEFT: ICD-10-CM

## 2021-06-24 DIAGNOSIS — M54.16 LUMBAR RADICULOPATHY: ICD-10-CM

## 2021-06-24 DIAGNOSIS — G89.29 HIP PAIN, CHRONIC, LEFT: ICD-10-CM

## 2021-06-24 PROCEDURE — 99214 OFFICE O/P EST MOD 30 MIN: CPT | Performed by: INTERNAL MEDICINE

## 2021-06-24 RX ORDER — VENLAFAXINE 37.5 MG/1
37.5 TABLET ORAL NIGHTLY
COMMUNITY
End: 2021-12-20

## 2021-06-24 NOTE — PROGRESS NOTES
Ozempic, and explained directions of action as well as dosing. She verbalized her understanding and agrees    Objective  /88 (Site: Left Upper Arm, Position: Sitting, Cuff Size: Medium Adult)   Pulse 89   Resp 16   Ht 5' 8\" (1.727 m)   Wt 170 lb (77.1 kg)   SpO2 98%   BMI 25.85 kg/m²   Constitutional: No acute distress. Sits in chair comfortably  Eyes: Sclerae nonicteric. No lid lag or proptosis  HENT: External ears normal. No external lesions on the nose  Neck: No gross masses. Trachea visibly midline  Respiratory: Good air entry bilaterally. No wheezing or crackles  Cardiovascular: Normal S1-S2. No murmurs. No lower extremity edema  Gastrointestinal: No visible masses. No visible hernias  Skin: No abnormal rashes. No abnormal masses  Neurologic: Cranial nerves grossly intact  Psychiatric: Normal affect.  Alert and oriented    Medications  Current Outpatient Medications:     venlafaxine (EFFEXOR) 37.5 MG tablet, Take 37.5 mg by mouth nightly, Disp: , Rfl:     levothyroxine (SYNTHROID) 100 MCG tablet, Take 1 tablet by mouth once daily, Disp: 90 tablet, Rfl: 3    polyethyl glycol-propyl glycol 0.4-0.3 % (SYSTANE) 0.4-0.3 % ophthalmic solution, 1 drop as needed for Dry Eyes, Disp: , Rfl:     Cholecalciferol (VITAMIN D3) 2000 UNITS CAPS, Take 1 capsule by mouth daily, Disp: , Rfl:     Probiotic Product (PROBIOTIC DAILY PO), Take 1 capsule by mouth daily, Disp: , Rfl:     Calcium Carbonate (CALCIUM 600 PO), Take 1 tablet by mouth daily, Disp: , Rfl:     metoprolol succinate (TOPROL XL) 25 MG extended release tablet, TAKE 1 TABLET DAILY (Patient taking differently: Taking 1/2 tab po daily), Disp: 90 tablet, Rfl: 3    Naproxen Sodium 220 MG CAPS, Take 220 mg by mouth as needed 1 tablets every 12 hours, Disp: , Rfl:     ascorbic acid (VITAMIN C) 500 MG tablet, Take 500 mg by mouth daily as needed , Disp: , Rfl:     Biotin 5000 MCG CAPS, Take 2,500 mcg by mouth daily , Disp: , Rfl:     magnesium (MAGNESIUM-OXIDE) 250 MG TABS tablet, Take 500 mg by mouth nightly., Disp: , Rfl:     celecoxib (CELEBREX) 200 MG capsule, Take 200 mg by mouth 2 times daily as needed for Pain (Patient not taking: Reported on 6/24/2021), Disp: , Rfl:     sertraline (ZOLOFT) 25 MG tablet, TAKE 1 TABLET DAILY (Patient not taking: Reported on 6/24/2021), Disp: 90 tablet, Rfl: 1  The patient is allergic to betadine [povidone iodine] and vicodin [hydrocodone-acetaminophen]. Past Medical History  Myranda Montes  has a past medical history of Anxiety and depression, Arthritis, Basal cell carcinoma, Benign paroxysmal positional vertigo, Carpal tunnel syndrome, Dyslipidemia, Hypomagnesemia, Hypothyroidism, Interstitial cystitis, Low magnesium levels, Migraines, Mitral valve prolapse, Neck pain, OA (osteoarthritis), Osteopenia, PVC's (premature ventricular contractions), and Rosacea. Past Surgical History  The patient  has a past surgical history that includes other surgical history; partial hysterectomy (cervix not removed) (12/18/01); Nose surgery (04/19/05); Skin cancer excision; Elbow surgery; Cystoscopy (05/11/07); Dilation and curettage of uterus (09/27/01); Colonoscopy (09/12/02); Colonoscopy (12-30-13); and Nose surgery (3/18/2005). Family History  This patient's family history includes Breast Cancer in her maternal aunt; Cancer in her father; Colon Cancer in her brother, father, paternal aunt, paternal aunt, paternal aunt, paternal aunt, paternal uncle, paternal uncle, and sister; Heart Attack in her mother; High Blood Pressure in her mother; Kidney Disease in her mother; Liver Cancer in her paternal grandmother; Other in her brother; Thyroid Disease in her mother. Social History  Myranda Montes  reports that she has never smoked. She has never used smokeless tobacco. She reports previous alcohol use. She reports that she does not use drugs. Discussed use, benefit, and side effects of prescribed medications. All questions answered.

## 2021-06-29 ENCOUNTER — HOSPITAL ENCOUNTER (OUTPATIENT)
Dept: BONE DENSITY | Age: 61
Discharge: HOME | End: 2021-07-01
Payer: COMMERCIAL

## 2021-06-29 ENCOUNTER — HOSPITAL ENCOUNTER (OUTPATIENT)
Dept: MAMMOGRAPHY | Age: 61
Discharge: HOME | End: 2021-07-01
Payer: COMMERCIAL

## 2021-06-29 DIAGNOSIS — Z12.31 ENCOUNTER FOR SCREENING MAMMOGRAM FOR MALIGNANT NEOPLASM OF BREAST: ICD-10-CM

## 2021-06-29 DIAGNOSIS — M85.80 OSTEOPENIA, UNSPECIFIED LOCATION: ICD-10-CM

## 2021-06-29 PROCEDURE — 77063 BREAST TOMOSYNTHESIS BI: CPT

## 2021-06-29 PROCEDURE — 77085 DXA BONE DENSITY AXL VRT FX: CPT

## 2021-07-01 DIAGNOSIS — N63.10 BREAST MASS, RIGHT: Primary | ICD-10-CM

## 2021-07-07 ENCOUNTER — HOSPITAL ENCOUNTER (OUTPATIENT)
Dept: MAMMOGRAPHY | Age: 61
Discharge: HOME | End: 2021-07-09
Payer: COMMERCIAL

## 2021-07-07 DIAGNOSIS — N63.10 BREAST MASS, RIGHT: ICD-10-CM

## 2021-07-07 PROCEDURE — G0279 TOMOSYNTHESIS, MAMMO: HCPCS

## 2021-08-31 RX ORDER — LEVOTHYROXINE SODIUM 0.1 MG/1
TABLET ORAL
Qty: 90 TABLET | Refills: 0 | Status: SHIPPED | OUTPATIENT
Start: 2021-08-31 | End: 2021-10-22 | Stop reason: SDUPTHER

## 2021-09-21 ENCOUNTER — OFFICE VISIT (OUTPATIENT)
Dept: INTERNAL MEDICINE | Age: 61
End: 2021-09-21
Payer: COMMERCIAL

## 2021-09-21 VITALS
WEIGHT: 161.4 LBS | DIASTOLIC BLOOD PRESSURE: 72 MMHG | SYSTOLIC BLOOD PRESSURE: 116 MMHG | TEMPERATURE: 98.7 F | BODY MASS INDEX: 24.46 KG/M2 | HEART RATE: 68 BPM | HEIGHT: 68 IN | RESPIRATION RATE: 18 BRPM

## 2021-09-21 DIAGNOSIS — E03.9 HYPOTHYROIDISM, UNSPECIFIED TYPE: ICD-10-CM

## 2021-09-21 DIAGNOSIS — M85.80 OSTEOPENIA, UNSPECIFIED LOCATION: Primary | ICD-10-CM

## 2021-09-21 PROCEDURE — 99214 OFFICE O/P EST MOD 30 MIN: CPT | Performed by: INTERNAL MEDICINE

## 2021-09-21 RX ORDER — ALENDRONATE SODIUM 70 MG/1
70 TABLET ORAL
Qty: 12 TABLET | Refills: 3 | Status: SHIPPED | OUTPATIENT
Start: 2021-09-21 | End: 2021-10-23 | Stop reason: SDUPTHER

## 2021-09-26 NOTE — PROGRESS NOTES
[facility]    Visit Date:  9/21/2021  Patient:  Sharee Coffey  YOB: 1960    Assessment & Plan     Osteopenia: Assessed with high FRAX score. Will treat with Fosamax. Reassess next visit. Hypothyroidism: We will continue to monitor TFTs. Continue levothyroxine. Asymptomatic at this time. We will continue Ozempic samples for weight loss. If weight plateaus, then will consider discontinuing it next time     Diagnosis Orders   1. Osteopenia, unspecified location     2. Hypothyroidism, unspecified type         Chief Complaint  Hypothyroidism (3mo) and Other (osteopenia)    History of Present Illness   Presents to follow-up. Had a DEXA scan done which showed osteopenia with high FRAX score. I counseled her regarding her options. She does not mind doing Fosamax on a weekly basis. I counseled regarding having to take it while sitting upright, and drinking a lot of water. She is otherwise asymptomatic at this time. Was given Ozempi samples c last visit, which seemed to help her lose weight. Her BMI is better now, but I advised that I am willing to continue giving her samples as long as she keeps losing weight. Objective  /72   Pulse 68   Temp 98.7 °F (37.1 °C) (Tympanic)   Resp 18   Ht 5' 8\" (1.727 m)   Wt 161 lb 6.4 oz (73.2 kg)   BMI 24.54 kg/m²   Constitutional: No acute distress. Sits in chair comfortably  Eyes: Sclerae nonicteric. No lid lag or proptosis  HENT: External ears normal. No external lesions on the nose  Neck: No gross masses. Trachea visibly midline  Respiratory: Good air entry bilaterally. No wheezing or crackles  Cardiovascular: Normal S1-S2. No murmurs. No lower extremity edema  Gastrointestinal: No visible masses. No visible hernias  Skin: No abnormal rashes. No abnormal masses  Neurologic: Cranial nerves grossly intact  Psychiatric: Normal affect.  Alert and oriented    Medications  Current Outpatient Medications:    alendronate (FOSAMAX) 70 MG tablet, Take 1 tablet by mouth every 7 days, Disp: 12 tablet, Rfl: 3    levothyroxine (EUTHYROX) 100 MCG tablet, Take 1 tablet by mouth once daily, Disp: 90 tablet, Rfl: 0    venlafaxine (EFFEXOR) 37.5 MG tablet, Take 37.5 mg by mouth nightly, Disp: , Rfl:     polyethyl glycol-propyl glycol 0.4-0.3 % (SYSTANE) 0.4-0.3 % ophthalmic solution, 1 drop as needed for Dry Eyes, Disp: , Rfl:     Probiotic Product (PROBIOTIC DAILY PO), Take 1 capsule by mouth daily, Disp: , Rfl:     Calcium Carbonate (CALCIUM 600 PO), Take 1 tablet by mouth daily, Disp: , Rfl:     metoprolol succinate (TOPROL XL) 25 MG extended release tablet, TAKE 1 TABLET DAILY (Patient taking differently: Taking 1/2 tab po daily), Disp: 90 tablet, Rfl: 3    Naproxen Sodium 220 MG CAPS, Take 220 mg by mouth as needed 1 tablets every 12 hours, Disp: , Rfl:     Biotin 5000 MCG CAPS, Take 2,500 mcg by mouth daily , Disp: , Rfl:     magnesium (MAGNESIUM-OXIDE) 250 MG TABS tablet, Take 500 mg by mouth nightly., Disp: , Rfl:     Allergies  Betadine [povidone iodine] and Vicodin [hydrocodone-acetaminophen]    Past Medical History:   Diagnosis Date    Anxiety and depression     Treated long term with SSRI.  Arthritis     Basal cell carcinoma     History of, abdomen.  Benign paroxysmal positional vertigo     History of.  Carpal tunnel syndrome     History of, EMG 12/09, with mild carpal tunnel syndrome bilaterally.  Dyslipidemia     10 year risk 1.6% 2/17    Hypomagnesemia     History of.  Hypothyroidism     Interstitial cystitis 05/07    History of interstitial cystitis with biopsy, noting interstitial cystitis.  Low magnesium levels     takes magnesium pills    Migraines     History of.    Mitral valve prolapse     Trivial mitral regurgitation on echo, 2002.  Neck pain     History of chronic neck and back pain, stable.      OA (osteoarthritis)     Osteopenia     Dexa 2016 Frax .3% 10 yr risk, DEXA 5/18 FRAX .5% 10 yr risk for HIP fracture    PVC's (premature ventricular contractions)     PACs treated with beta blockers.  Rosacea      Past Surgical History:   Procedure Laterality Date    COLONOSCOPY  09/12/02    COLONOSCOPY  12-30-13    normal cs, repeat 5 yrs family hx colon cancer    CYSTOSCOPY  05/11/07    DILATION AND CURETTAGE OF UTERUS  09/27/01    Hysteroscopy.  ELBOW SURGERY      Right elbow epicondylitis surgery in October 2010 with Dr. Jackson Sanchez.  NOSE SURGERY  04/19/05    Status post fracture with turbinectomy, polyp removed and septoplasty.  NOSE SURGERY  3/18/2005    septorhinoplasty    OTHER SURGICAL HISTORY      TVT. Anterior repair for cystocele done in her 35s.  PARTIAL HYSTERECTOMY  12/18/01    Supracervical.  Ovaries remain    SKIN CANCER EXCISION      Basal cell carcinoma removed from the abdomen in the past.       Family History  This patient's family history includes BRCA 1 Positive in her maternal aunt and maternal aunt; Breast Cancer in her maternal aunt; Cancer in her father; Colon Cancer in her brother, father, paternal aunt, paternal aunt, paternal aunt, paternal aunt, paternal uncle, paternal uncle, and sister; Heart Attack in her mother; High Blood Pressure in her mother; Kidney Disease in her mother; Liver Cancer in her paternal grandmother; No Known Problems in her sister and sister; Other in her brother; Thyroid Disease in her mother. Social History  Adam Butts  reports that she has never smoked. She has never used smokeless tobacco. She reports previous alcohol use. She reports that she does not use drugs. Discussed use, benefit, and side effects of prescribed medications. All questions answered. Patient voiced understanding. Reviewed health maintenance.       Electronically signed Carlos Medrano MD on 9/21/2021 at 2:44 PM    This note has been created using the Epic electronic health record, and dictated in part by [employer] dictation system. Despite the documenting physician's best efforts, there may be errors in spelling, grammar or syntax.

## 2021-10-23 RX ORDER — LEVOTHYROXINE SODIUM 0.1 MG/1
TABLET ORAL
Qty: 90 TABLET | Refills: 0 | Status: SHIPPED | OUTPATIENT
Start: 2021-10-23 | End: 2022-02-28 | Stop reason: SDUPTHER

## 2021-10-23 RX ORDER — ALENDRONATE SODIUM 70 MG/1
70 TABLET ORAL
Qty: 12 TABLET | Refills: 0 | Status: SHIPPED | OUTPATIENT
Start: 2021-10-23 | End: 2022-03-28

## 2021-12-20 ENCOUNTER — OFFICE VISIT (OUTPATIENT)
Dept: INTERNAL MEDICINE | Age: 61
End: 2021-12-20
Payer: COMMERCIAL

## 2021-12-20 VITALS
BODY MASS INDEX: 24.4 KG/M2 | HEART RATE: 70 BPM | RESPIRATION RATE: 16 BRPM | TEMPERATURE: 95.5 F | SYSTOLIC BLOOD PRESSURE: 115 MMHG | WEIGHT: 161 LBS | DIASTOLIC BLOOD PRESSURE: 70 MMHG | HEIGHT: 68 IN

## 2021-12-20 DIAGNOSIS — F41.9 ANXIETY AND DEPRESSION: Primary | ICD-10-CM

## 2021-12-20 DIAGNOSIS — F32.A ANXIETY AND DEPRESSION: Primary | ICD-10-CM

## 2021-12-20 DIAGNOSIS — E78.5 HYPERLIPIDEMIA, UNSPECIFIED HYPERLIPIDEMIA TYPE: ICD-10-CM

## 2021-12-20 DIAGNOSIS — R10.13 DYSPEPSIA: ICD-10-CM

## 2021-12-20 DIAGNOSIS — E03.9 HYPOTHYROIDISM, UNSPECIFIED TYPE: ICD-10-CM

## 2021-12-20 DIAGNOSIS — M85.80 OSTEOPENIA, UNSPECIFIED LOCATION: ICD-10-CM

## 2021-12-20 PROCEDURE — 99214 OFFICE O/P EST MOD 30 MIN: CPT | Performed by: INTERNAL MEDICINE

## 2021-12-20 RX ORDER — ESCITALOPRAM OXALATE 10 MG/1
10 TABLET ORAL DAILY
Qty: 30 TABLET | Refills: 1 | Status: SHIPPED | OUTPATIENT
Start: 2021-12-20 | End: 2022-02-14 | Stop reason: SDUPTHER

## 2021-12-20 RX ORDER — ZINC GLUCONATE 50 MG
50 TABLET ORAL DAILY
COMMUNITY
End: 2022-10-03

## 2021-12-20 RX ORDER — ESOMEPRAZOLE MAGNESIUM 40 MG/1
40 CAPSULE, DELAYED RELEASE ORAL DAILY
Qty: 30 CAPSULE | Refills: 3 | Status: SHIPPED | OUTPATIENT
Start: 2021-12-20 | End: 2022-03-28

## 2021-12-20 NOTE — PROGRESS NOTES
[facility] INTERNAL MEDICINE    Visit Date:  12/20/2021  Patient:  Tori Shultz  YOB: 1960    Assessment & Plan     Anxiety: We will discontinue Effexor, and start Lexapro 10 mg daily. Reassess next visit. Dyspepsia: We will prescribe Nexium 40 mg daily. Reassess next visit. Will refer for EGD if Nexium is ineffective    Hypothyroidism: Continue levothyroxine. We will continue to monitor. Hyperlipidemia: We will continue to monitor. Osteopenia: We will discontinue Fosamax because it might be causing her symptoms. Reassess next visit. Diagnosis Orders   1. Anxiety and depression     2. Osteopenia, unspecified location     3. Hypothyroidism, unspecified type     4. Hyperlipidemia, unspecified hyperlipidemia type     5. Dyspepsia         Chief Complaint  Hypothyroidism (3 month) and Other (Osteopenia)    History of Present Illness   Presents to follow-up. Reports that she has been feeling extremely anxious lately, says that it might be related to Covid as well. She has been on Effexor for that, but does not seem to be completely helpful for her. She says that she feels anxious most of the time during the day. No specific triggers. Denies fever, chills, weight loss. Moreover, she reports that she has been having epigastric pain for the last several months, says it has been on and off, but now it is more severe than before. Says that the pain is sharp and nonradiating. Denies fever, chills. Has nausea but no vomiting. Denies diarrhea, constipation, hematochezia. Says that she often has pain at night, but would prefer to defer EGD at this time, prefers to try Nexium first.  Of note, she uses Fosamax for osteopenia.     Has a history of hypothyroidism, hyperlipidemia that are unchanged    Objective  /70 (Site: Left Upper Arm, Position: Sitting)   Pulse 70   Temp 95.5 °F (35.3 °C) (Tympanic)   Resp 16   Ht 5' 8\" (1.727 m)   Wt 161 lb (73 kg)   BMI 24.48 kg/m²   Constitutional: No acute distress. Sits in chair comfortably  Eyes: Sclerae nonicteric. No lid lag or proptosis  HENT: External ears normal. No external lesions on the nose  Neck: No gross masses. Trachea visibly midline  Respiratory: Good air entry bilaterally. No wheezing or crackles  Cardiovascular: Normal S1-S2. No murmurs. No lower extremity edema  Gastrointestinal: No visible masses. No visible hernias  Skin: No abnormal rashes. No abnormal masses  Neurologic: Cranial nerves grossly intact  Psychiatric: Normal affect.  Alert and oriented    Medications  Current Outpatient Medications:     zinc gluconate 50 MG tablet, Take 50 mg by mouth daily, Disp: , Rfl:     escitalopram (LEXAPRO) 10 MG tablet, Take 1 tablet by mouth daily, Disp: 30 tablet, Rfl: 1    esomeprazole (NEXIUM) 40 MG delayed release capsule, Take 1 capsule by mouth daily, Disp: 30 capsule, Rfl: 3    alendronate (FOSAMAX) 70 MG tablet, Take 1 tablet by mouth every 7 days, Disp: 12 tablet, Rfl: 0    levothyroxine (EUTHYROX) 100 MCG tablet, Take 1 tablet by mouth once daily, Disp: 90 tablet, Rfl: 0    polyethyl glycol-propyl glycol 0.4-0.3 % (SYSTANE) 0.4-0.3 % ophthalmic solution, 1 drop as needed for Dry Eyes, Disp: , Rfl:     Probiotic Product (PROBIOTIC DAILY PO), Take 1 capsule by mouth daily, Disp: , Rfl:     Calcium Carbonate (CALCIUM 600 PO), Take 1 tablet by mouth daily, Disp: , Rfl:     metoprolol succinate (TOPROL XL) 25 MG extended release tablet, TAKE 1 TABLET DAILY (Patient taking differently: Taking 1/2 tab po daily), Disp: 90 tablet, Rfl: 3    Naproxen Sodium 220 MG CAPS, Take 220 mg by mouth as needed 1 tablets every 12 hours, Disp: , Rfl:     Biotin 5000 MCG CAPS, Take 2,500 mcg by mouth daily , Disp: , Rfl:     magnesium (MAGNESIUM-OXIDE) 250 MG TABS tablet, Take 500 mg by mouth nightly., Disp: , Rfl:     Allergies  Betadine [povidone iodine] and Vicodin [hydrocodone-acetaminophen]    Past Medical History:   Diagnosis Date    Anxiety and depression     Treated long term with SSRI.  Arthritis     Basal cell carcinoma     History of, abdomen.  Benign paroxysmal positional vertigo     History of.  Carpal tunnel syndrome     History of, EMG 12/09, with mild carpal tunnel syndrome bilaterally.  Dyslipidemia     10 year risk 1.6% 2/17    Hypomagnesemia     History of.  Hypothyroidism     Interstitial cystitis 05/07    History of interstitial cystitis with biopsy, noting interstitial cystitis.  Low magnesium levels     takes magnesium pills    Migraines     History of.    Mitral valve prolapse     Trivial mitral regurgitation on echo, 2002.  Neck pain     History of chronic neck and back pain, stable.  OA (osteoarthritis)     Osteopenia     Dexa 2016 Frax .3% 10 yr risk, DEXA 5/18 FRAX .5% 10 yr risk for HIP fracture    PVC's (premature ventricular contractions)     PACs treated with beta blockers.  Rosacea      Past Surgical History:   Procedure Laterality Date    COLONOSCOPY  09/12/02    COLONOSCOPY  12-30-13    normal cs, repeat 5 yrs family hx colon cancer    CYSTOSCOPY  05/11/07    DILATION AND CURETTAGE OF UTERUS  09/27/01    Hysteroscopy.  ELBOW SURGERY      Right elbow epicondylitis surgery in October 2010 with Dr. Mary Matt.  NOSE SURGERY  04/19/05    Status post fracture with turbinectomy, polyp removed and septoplasty.  NOSE SURGERY  3/18/2005    septorhinoplasty    OTHER SURGICAL HISTORY      TVT. Anterior repair for cystocele done in her 35s.  PARTIAL HYSTERECTOMY  12/18/01    Supracervical.  Ovaries remain    SKIN CANCER EXCISION      Basal cell carcinoma removed from the abdomen in the past.       Family History  This patient's family history includes BRCA 1 Positive in her maternal aunt and maternal aunt; Breast Cancer in her maternal aunt;  Cancer in her father; Colon Cancer in her brother, father, paternal aunt, paternal aunt, paternal aunt, paternal aunt, paternal uncle, paternal uncle, and sister; Heart Attack in her mother; High Blood Pressure in her mother; Kidney Disease in her mother; Liver Cancer in her paternal grandmother; No Known Problems in her sister and sister; Other in her brother; Thyroid Disease in her mother. Social History  Caroline Pringle  reports that she has never smoked. She has never used smokeless tobacco. She reports previous alcohol use. She reports that she does not use drugs. Discussed use, benefit, and side effects of prescribed medications. All questions answered. Patient voiced understanding. Reviewed health maintenance. Electronically signed Wiley Patrick MD on 12/20/2021 at 10:39 AM    This note has been created using the Epic electronic health record, and dictated in part by [email or web address] dictation system. Despite the documenting physician's best efforts, there may be errors in spelling, grammar or syntax.

## 2022-01-10 ENCOUNTER — TELEPHONE (OUTPATIENT)
Dept: INTERNAL MEDICINE | Age: 62
End: 2022-01-10

## 2022-01-10 NOTE — TELEPHONE ENCOUNTER
Are we sure it is not because she stopped the Ozempic? Because it is hard to gauge if the Lexapro would have worked for her in the short period of time.   I do not mind switching to something else, but lets confirm this first

## 2022-01-10 NOTE — TELEPHONE ENCOUNTER
I called and spoke with patient, she is going to give it another week and a half and monitor her weight, and see if the Lexapro truly helps her anxiety. Patient will call back if her weight is continuing to rise and will keep appointment as followed.

## 2022-01-10 NOTE — TELEPHONE ENCOUNTER
I called and spoke to patient, she has been on Lexapro 10mg since December 20th,2021. She stated she has gained almost 5lbs. She has tried Zoloft in the past long term, and tried Effexor prior to Lexapro. left

## 2022-01-10 NOTE — TELEPHONE ENCOUNTER
Last appt: 12/20/2021  Next appt: 2/1/2022    Patient called in saying that her Lexapro medication has been making her gain weight. She wasn't sure what other anxiety medication  wanted to put her on.

## 2022-02-14 ENCOUNTER — OFFICE VISIT (OUTPATIENT)
Dept: INTERNAL MEDICINE | Age: 62
End: 2022-02-14
Payer: COMMERCIAL

## 2022-02-14 VITALS
HEIGHT: 69 IN | TEMPERATURE: 95 F | BODY MASS INDEX: 24.88 KG/M2 | DIASTOLIC BLOOD PRESSURE: 70 MMHG | SYSTOLIC BLOOD PRESSURE: 118 MMHG | RESPIRATION RATE: 16 BRPM | HEART RATE: 78 BPM | WEIGHT: 168 LBS | OXYGEN SATURATION: 98 %

## 2022-02-14 DIAGNOSIS — E03.9 HYPOTHYROIDISM, UNSPECIFIED TYPE: Primary | ICD-10-CM

## 2022-02-14 DIAGNOSIS — F41.9 ANXIETY AND DEPRESSION: ICD-10-CM

## 2022-02-14 DIAGNOSIS — F32.A ANXIETY AND DEPRESSION: ICD-10-CM

## 2022-02-14 DIAGNOSIS — M85.80 OSTEOPENIA, UNSPECIFIED LOCATION: ICD-10-CM

## 2022-02-14 PROCEDURE — 99214 OFFICE O/P EST MOD 30 MIN: CPT | Performed by: INTERNAL MEDICINE

## 2022-02-14 PROCEDURE — 99212 OFFICE O/P EST SF 10 MIN: CPT

## 2022-02-14 RX ORDER — ALPRAZOLAM 0.25 MG/1
0.25 TABLET ORAL DAILY PRN
Qty: 30 TABLET | Refills: 0 | Status: SHIPPED | OUTPATIENT
Start: 2022-02-14 | End: 2022-03-16

## 2022-02-14 RX ORDER — ESCITALOPRAM OXALATE 5 MG/1
5 TABLET ORAL DAILY
Qty: 30 TABLET | Refills: 1 | Status: SHIPPED | OUTPATIENT
Start: 2022-02-14 | End: 2022-03-28

## 2022-02-28 RX ORDER — LEVOTHYROXINE SODIUM 0.1 MG/1
TABLET ORAL
Qty: 90 TABLET | Refills: 3 | Status: SHIPPED | OUTPATIENT
Start: 2022-02-28 | End: 2022-03-23

## 2022-02-28 NOTE — PROGRESS NOTES
[address] [facility] INTERNAL MEDICINE    Visit Date:  2/14/2022  Patient:  Jenny Hickey  YOB: 1960    Assessment & Plan     Anxiety: She said that she used alprazolam sparingly in the past, will give a refill, I advised that she needs to use it sparingly. Will decrease Lexapro dose to 5 mg daily. Reassess next visit. Hypothyroidism: Continue levothyroxine. We will continue to monitor. Osteopenia: Continue Fosamax. We will continue to monitor. Diagnosis Orders   1. Hypothyroidism, unspecified type     2. Anxiety and depression  ALPRAZolam (XANAX) 0.25 MG tablet   3. Osteopenia, unspecified location         Chief Complaint  Anxiety (6 week follow up), Depression, and Hypothyroidism    History of Present Illness   Presents to follow-up. Last visit she was started on Lexapro 10 mg daily due to anxiety, she feels like she is still anxious, however she feels also that the Lexapro has caused her side effects and feels that it caused her to feel weird. She says that she has used alprazolam sparingly in the past and says that the refill used to last for several months. I advised that I can provide a refill right now and possibly cut down the dose of Lexapro to 5 mg daily. She agrees to that. Says that there is no additional stressors in her life or there is anything changed with her life circumstances. Has a history of hypothyroidism as well as osteopenia that are unchanged    Objective  /70 (Site: Left Upper Arm, Position: Sitting)   Pulse 78   Temp 95 °F (35 °C) (Tympanic)   Resp 16   Ht 5' 8.5\" (1.74 m)   Wt 168 lb (76.2 kg)   SpO2 98%   BMI 25.17 kg/m²   Constitutional: No acute distress. Sits in chair comfortably  Eyes: Sclerae nonicteric. No lid lag or proptosis  HENT: External ears normal. No external lesions on the nose  Neck: No gross masses. Trachea visibly midline  Respiratory: Good air entry bilaterally.   No wheezing or crackles  Cardiovascular: Normal S1-S2. No murmurs. No lower extremity edema  Gastrointestinal: No visible masses. No visible hernias  Skin: No abnormal rashes. No abnormal masses  Neurologic: Cranial nerves grossly intact  Psychiatric: Normal affect. Alert and oriented    Medications  Current Outpatient Medications:     escitalopram (LEXAPRO) 5 MG tablet, Take 1 tablet by mouth daily, Disp: 30 tablet, Rfl: 1    ALPRAZolam (XANAX) 0.25 MG tablet, Take 1 tablet by mouth daily as needed for Anxiety for up to 30 days. , Disp: 30 tablet, Rfl: 0    zinc gluconate 50 MG tablet, Take 50 mg by mouth daily, Disp: , Rfl:     levothyroxine (EUTHYROX) 100 MCG tablet, Take 1 tablet by mouth once daily, Disp: 90 tablet, Rfl: 0    polyethyl glycol-propyl glycol 0.4-0.3 % (SYSTANE) 0.4-0.3 % ophthalmic solution, 1 drop as needed for Dry Eyes, Disp: , Rfl:     metoprolol succinate (TOPROL XL) 25 MG extended release tablet, TAKE 1 TABLET DAILY (Patient taking differently: Taking 1/2 tab po daily), Disp: 90 tablet, Rfl: 3    Naproxen Sodium 220 MG CAPS, Take 220 mg by mouth as needed 1 tablets every 12 hours, Disp: , Rfl:     Biotin 5000 MCG CAPS, Take 2,500 mcg by mouth daily , Disp: , Rfl:     magnesium (MAGNESIUM-OXIDE) 250 MG TABS tablet, Take 500 mg by mouth nightly., Disp: , Rfl:     esomeprazole (NEXIUM) 40 MG delayed release capsule, Take 1 capsule by mouth daily (Patient not taking: Reported on 2/14/2022), Disp: 30 capsule, Rfl: 3    alendronate (FOSAMAX) 70 MG tablet, Take 1 tablet by mouth every 7 days (Patient not taking: Reported on 2/14/2022), Disp: 12 tablet, Rfl: 0    Probiotic Product (PROBIOTIC DAILY PO), Take 1 capsule by mouth daily (Patient not taking: Reported on 2/14/2022), Disp: , Rfl:     Calcium Carbonate (CALCIUM 600 PO), Take 1 tablet by mouth daily (Patient not taking: Reported on 2/14/2022), Disp: , Rfl:     Allergies  Betadine [povidone iodine] and Vicodin [hydrocodone-acetaminophen]    Past Medical History:   Diagnosis Date    Anxiety and depression     Treated long term with SSRI.  Arthritis     Basal cell carcinoma     History of, abdomen.  Benign paroxysmal positional vertigo     History of.  Carpal tunnel syndrome     History of, EMG 12/09, with mild carpal tunnel syndrome bilaterally.  Dyslipidemia     10 year risk 1.6% 2/17    Hypomagnesemia     History of.  Hypothyroidism     Interstitial cystitis 05/07    History of interstitial cystitis with biopsy, noting interstitial cystitis.  Low magnesium levels     takes magnesium pills    Migraines     History of.    Mitral valve prolapse     Trivial mitral regurgitation on echo, 2002.  Neck pain     History of chronic neck and back pain, stable.  OA (osteoarthritis)     Osteopenia     Dexa 2016 Frax .3% 10 yr risk, DEXA 5/18 FRAX .5% 10 yr risk for HIP fracture    PVC's (premature ventricular contractions)     PACs treated with beta blockers.  Rosacea      Past Surgical History:   Procedure Laterality Date    COLONOSCOPY  09/12/02    COLONOSCOPY  12-30-13    normal cs, repeat 5 yrs family hx colon cancer    CYSTOSCOPY  05/11/07    DILATION AND CURETTAGE OF UTERUS  09/27/01    Hysteroscopy.  ELBOW SURGERY      Right elbow epicondylitis surgery in October 2010 with Dr. Osmel Lazaro.  NOSE SURGERY  04/19/05    Status post fracture with turbinectomy, polyp removed and septoplasty.  NOSE SURGERY  3/18/2005    septorhinoplasty    OTHER SURGICAL HISTORY      TVT. Anterior repair for cystocele done in her 35s.  PARTIAL HYSTERECTOMY  12/18/01    Supracervical.  Ovaries remain    SKIN CANCER EXCISION      Basal cell carcinoma removed from the abdomen in the past.       Family History  This patient's family history includes BRCA 1 Positive in her maternal aunt and maternal aunt; Breast Cancer in her maternal aunt;  Cancer in her father; Colon Cancer in her brother, father, paternal aunt, paternal aunt, paternal aunt, paternal aunt, paternal uncle, paternal uncle, and sister; Heart Attack in her mother; High Blood Pressure in her mother; Kidney Disease in her mother; Liver Cancer in her paternal grandmother; No Known Problems in her sister and sister; Other in her brother; Thyroid Disease in her mother. Social History  Mejia Found  reports that she has never smoked. She has never used smokeless tobacco. She reports previous alcohol use. She reports that she does not use drugs. Discussed use, benefit, and side effects of prescribed medications. All questions answered. Patient voiced understanding. Reviewed health maintenance. Electronically signed Jl Mir MD on 2/14/2022 at 1:08 AM    This note has been created using the Epic electronic health record, and dictated in part by [employer] dictation system. Despite the documenting physician's best efforts, there may be errors in spelling, grammar or syntax.

## 2022-02-28 NOTE — TELEPHONE ENCOUNTER
Last Appt:  2/14/2022  Next Appt:   3/28/2022  Med verified in [facility] [address]

## 2022-02-28 NOTE — TELEPHONE ENCOUNTER
----- Message from Tori Kong sent at 2/28/2022  7:15 AM EST -----  Subject: Refill Request    QUESTIONS  Name of Medication? levothyroxine (EUTHYROX) 100 MCG tablet  Patient-reported dosage and instructions? 100mcg  How many days do you have left? 0  Preferred Pharmacy? [address]  Pharmacy phone number (if available)? [phone]  Additional Information for Provider? Patient needs urgent refill, does not   have any pills left  ---------------------------------------------------------------------------  --------------  CALL BACK INFO  What is the best way for the office to contact you? OK to leave message on   voicemail  Preferred Call Back Phone Number?  [phone]

## 2022-03-23 RX ORDER — LEVOTHYROXINE SODIUM 0.1 MG/1
TABLET ORAL
Qty: 90 TABLET | Refills: 3 | Status: SHIPPED | OUTPATIENT
Start: 2022-03-23

## 2022-03-23 NOTE — TELEPHONE ENCOUNTER
Left message with patient to verify which pharmacy she wants to use. Was sent to [facility] last month for one year. This request is from [address]

## 2022-03-28 ENCOUNTER — TELEPHONE (OUTPATIENT)
Dept: SURGERY | Age: 62
End: 2022-03-28

## 2022-03-28 ENCOUNTER — OFFICE VISIT (OUTPATIENT)
Dept: INTERNAL MEDICINE | Age: 62
End: 2022-03-28
Payer: COMMERCIAL

## 2022-03-28 VITALS
DIASTOLIC BLOOD PRESSURE: 82 MMHG | SYSTOLIC BLOOD PRESSURE: 110 MMHG | BODY MASS INDEX: 25.06 KG/M2 | RESPIRATION RATE: 16 BRPM | WEIGHT: 169.2 LBS | HEIGHT: 69 IN | OXYGEN SATURATION: 99 % | HEART RATE: 78 BPM | TEMPERATURE: 97.3 F

## 2022-03-28 DIAGNOSIS — Z12.11 SCREENING FOR COLON CANCER: Primary | ICD-10-CM

## 2022-03-28 DIAGNOSIS — F41.9 ANXIETY AND DEPRESSION: ICD-10-CM

## 2022-03-28 DIAGNOSIS — E03.9 HYPOTHYROIDISM, UNSPECIFIED TYPE: ICD-10-CM

## 2022-03-28 DIAGNOSIS — I49.3 PVC'S (PREMATURE VENTRICULAR CONTRACTIONS): ICD-10-CM

## 2022-03-28 DIAGNOSIS — F32.A ANXIETY AND DEPRESSION: ICD-10-CM

## 2022-03-28 DIAGNOSIS — M85.80 OSTEOPENIA, UNSPECIFIED LOCATION: ICD-10-CM

## 2022-03-28 PROCEDURE — 99212 OFFICE O/P EST SF 10 MIN: CPT | Performed by: INTERNAL MEDICINE

## 2022-03-28 PROCEDURE — 99214 OFFICE O/P EST MOD 30 MIN: CPT | Performed by: INTERNAL MEDICINE

## 2022-03-28 NOTE — PROGRESS NOTES
[facility] INTERNAL MEDICINE    Visit Date:  3/28/2022  Patient:  Lavella Nyhan  YOB: 1960    Assessment & Plan     Osteopenia: She was not able to tolerate oral bisphosphonates. Will order Reclast.  Will order DEXA scan as necessary. Hypothyroidism: Continue levothyroxine. We will continue to monitor. Anxiety: Can stop Lexapro. Will reassess next visit. PVCs: On metoprolol per cardiology, last saw cardiology many years ago. We will continue for now    We will order referral to general surgery for screening colonoscopy for colon cancer     Diagnosis Orders   1. Screening for colon cancer  Sana Hoover MD, General Surgery, [place]   2. Osteopenia, unspecified location     3. Hypothyroidism, unspecified type     4. Anxiety and depression     5. PVC's (premature ventricular contractions)         Chief Complaint  Anxiety (6 week f/u) and Hypothyroidism    History of Present Illness   Presents to follow-up. Last visit, dose of Lexapro was decreased to 5 mg due to side effects. She says that she would like to stop it now, says that she does not feel anxious at all at this time. Says that he would prefer to be on less medications. Says that the circumstances improved at her work, and she thinks that she might not be anxious anymore. I advised that we can stop Lexapro and reassess next the. Has a history of osteopenia, with high FRAX 10-year fracture probability. She was started on Fosamax previously, but could not tolerate the GI side effects. I advised that we can try IV Reclast.  I counseled guarding side effects including but not limited to ONJ. She says that she agrees to start the request for now. Has a history of PVCs for which she was started on metoprolol by cardiology many years ago. She says that she was told by her cardiologist at the time that valve regurgitation was the cause of her PVCs.   She says that she has occasional palpitations, despite taking the metoprolol daily. She says that she wants to try to stop it, I advised that there might be recurrence of her symptoms, and that I would prefer that she sees a cardiologist before stopping it, or we can attempt to try to stop it next visit, but that might be recurrence of her symptoms. Has a history of hypothyroidism that is unchanged    Objective  /82 (Site: Left Upper Arm, Position: Sitting)   Pulse 78   Temp 97.3 °F (36.3 °C) (Tympanic)   Resp 16   Ht 5' 8.5\" (1.74 m)   Wt 169 lb 3.2 oz (76.7 kg)   SpO2 99%   BMI 25.35 kg/m²   Constitutional: No acute distress. Sits in chair comfortably  Eyes: Sclerae nonicteric. No lid lag or proptosis  HENT: External ears normal. No external lesions on the nose  Neck: No gross masses. Trachea visibly midline  Respiratory: Good air entry bilaterally. No wheezing or crackles  Cardiovascular: Normal S1-S2. No murmurs. No lower extremity edema  Gastrointestinal: No visible masses. No visible hernias  Skin: No abnormal rashes. No abnormal masses  Neurologic: Cranial nerves grossly intact  Psychiatric: Normal affect.  Alert and oriented    Medications  Current Outpatient Medications:     levothyroxine (EUTHYROX) 100 MCG tablet, TAKE 1 TABLET DAILY, Disp: 90 tablet, Rfl: 3    zinc gluconate 50 MG tablet, Take 50 mg by mouth daily, Disp: , Rfl:     polyethyl glycol-propyl glycol 0.4-0.3 % (SYSTANE) 0.4-0.3 % ophthalmic solution, 1 drop as needed for Dry Eyes, Disp: , Rfl:     metoprolol succinate (TOPROL XL) 25 MG extended release tablet, TAKE 1 TABLET DAILY (Patient taking differently: Taking 1/2 tab po daily), Disp: 90 tablet, Rfl: 3    Naproxen Sodium 220 MG CAPS, Take 220 mg by mouth as needed 1 tablets every 12 hours, Disp: , Rfl:     Biotin 5000 MCG CAPS, Take 2,500 mcg by mouth daily , Disp: , Rfl:     magnesium (MAGNESIUM-OXIDE) 250 MG TABS tablet, Take 500 mg by mouth nightly., Disp: , Rfl:     Probiotic Product (PROBIOTIC DAILY PO), Take Basal cell carcinoma removed from the abdomen in the past.       Family History  This patient's family history includes BRCA 1 Positive in her maternal aunt and maternal aunt; Breast Cancer in her maternal aunt; Cancer in her father; Colon Cancer in her brother, father, paternal aunt, paternal aunt, paternal aunt, paternal aunt, paternal uncle, paternal uncle, and sister; Heart Attack in her mother; High Blood Pressure in her mother; Kidney Disease in her mother; Liver Cancer in her paternal grandmother; No Known Problems in her sister and sister; Other in her brother; Thyroid Disease in her mother. Social History  Brittny Kendrick  reports that she has never smoked. She has never used smokeless tobacco. She reports previous alcohol use. She reports that she does not use drugs. Discussed use, benefit, and side effects of prescribed medications. All questions answered. Patient voiced understanding. Reviewed health maintenance. Electronically signed Yarelis Francis MD on 3/28/2022 at 11:47 AM    This note has been created using the Epic electronic health record, and dictated in part by [employer] dictation system. Despite the documenting physician's best efforts, there may be errors in spelling, grammar or syntax.

## 2022-04-18 ENCOUNTER — TELEPHONE (OUTPATIENT)
Dept: SURGERY | Age: 62
End: 2022-04-18

## 2022-04-18 NOTE — TELEPHONE ENCOUNTER
Received paperwork in the mail for patient to schedule colonoscopy.  Procedure scheduled at [facility] per patient request.

## 2022-04-18 NOTE — TELEPHONE ENCOUNTER
H &P Colonoscopy  Patient:[place]                 :1960  (yes) patient has seen Dr. Sury Gandara prior to procedure  PCP: Dr. Bulmaro Read    CC: colon cancer screening          Family history of colon cancer      HPI:        Colonoscopy  Abd pain: no  Anemia: no  Bloating:no  Diarrhea: no  Constipation: no  Melena: no  Hematochezia:no  Rectal Bleeding:no  Rectal/Anal Pain:no  Pruritus: no  Family history colon Cancer: yes, father and multiple aunts and uncles  Previous colon cancer: no  Previous Colon Polyp: no  Change in bowels: no  Decrease caliber of stool: no  Change in color of stool: no    Previous work up date: 13-colonoscopy=few diverticuli. Dr. Sury Gandara at [facility]       Family History   Problem Relation Age of Onset    Cancer Father         Bowel, rectal, prostate and skin    Colon Cancer Father     Other Brother         Pneumonia    Heart Attack Mother     High Blood Pressure Mother     Kidney Disease Mother     Thyroid Disease Mother     Breast Cancer Maternal Aunt     Colon Cancer Paternal Aunt     Liver Cancer Paternal Grandmother     Colon Cancer Paternal Aunt     Colon Cancer Sister     Colon Cancer Brother     Colon Cancer Paternal Aunt     Colon Cancer Paternal Aunt     Colon Cancer Paternal Uncle     Colon Cancer Paternal Uncle     No Known Problems Sister     No Known Problems Sister     BRCA 1 Positive Maternal Aunt     BRCA 1 Positive Maternal Aunt      Social History     Socioeconomic History    Marital status:      Spouse name: Not on file    Number of children: Not on file    Years of education: Not on file    Highest education level: Not on file   Occupational History    Not on file   Tobacco Use    Smoking status: Never Smoker    Smokeless tobacco: Never Used   Vaping Use    Vaping Use: Never used   Substance and Sexual Activity    Alcohol use: Not Currently     Alcohol/week: 0.0 standard drinks     Comment: Rarely.     Drug use: No    Sexual activity: Not on file   Other Topics Concern    Not on file   Social History Narrative    Not on file     Social Determinants of Health     Financial Resource Strain:     Difficulty of Paying Living Expenses: Not on file   Food Insecurity:     Worried About [address] in the Last Year: Not on file    Jorge A of Food in the Last Year: Not on file   Transportation Needs:     Lack of Transportation (Medical): Not on file    Lack of Transportation (Non-Medical): Not on file   Physical Activity:     Days of Exercise per Week: Not on file    Minutes of Exercise per Session: Not on file   Stress:     Feeling of Stress : Not on file   Social Connections:     Frequency of Communication with Friends and Family: Not on file    Frequency of Social Gatherings with Friends and Family: Not on file    Attends Anabaptism Services: Not on file    Active Member of [address] [place] [address] or Organizations: Not on file    Attends Club or Organization Meetings: Not on file    Marital Status: Not on file   Intimate Partner Violence:     Fear of Current or Ex-Partner: Not on file    Emotionally Abused: Not on file    Physically Abused: Not on file    Sexually Abused: Not on file   Housing Stability:     Unable to Pay for Housing in the Last Year: Not on file    Number of [place] in the Last Year: Not on file    Unstable Housing in the Last Year: Not on file     Past Surgical History:   Procedure Laterality Date    COLONOSCOPY  09/12/02    COLONOSCOPY  12-30-13    normal cs, repeat 5 yrs family hx colon cancer    CYSTOSCOPY  05/11/07    DILATION AND CURETTAGE OF UTERUS  09/27/01    Hysteroscopy.  ELBOW SURGERY      Right elbow epicondylitis surgery in October 2010 with Dr. Chiara Milian.  NOSE SURGERY  04/19/05    Status post fracture with turbinectomy, polyp removed and septoplasty.  NOSE SURGERY  3/18/2005    septorhinoplasty    OTHER SURGICAL HISTORY      TVT. Anterior repair for cystocele done in her 35s.      PARTIAL HYSTERECTOMY  12/18/01    Supracervical.  Ovaries remain    SKIN CANCER EXCISION      Basal cell carcinoma removed from the abdomen in the past.       Past Medical History:   Diagnosis Date    Anxiety and depression     Treated long term with SSRI.  Arthritis     Basal cell carcinoma     History of, abdomen.  Benign paroxysmal positional vertigo     History of.  Carpal tunnel syndrome     History of, EMG 12/09, with mild carpal tunnel syndrome bilaterally.  Dyslipidemia     10 year risk 1.6% 2/17    Hypomagnesemia     History of.  Hypothyroidism     Interstitial cystitis 05/07    History of interstitial cystitis with biopsy, noting interstitial cystitis.  Low magnesium levels     takes magnesium pills    Migraines     History of.    Mitral valve prolapse     Trivial mitral regurgitation on echo, 2002.  Neck pain     History of chronic neck and back pain, stable.  OA (osteoarthritis)     Osteopenia     Dexa 2016 Frax .3% 10 yr risk, DEXA 5/18 FRAX .5% 10 yr risk for HIP fracture    PVC's (premature ventricular contractions)     PACs treated with beta blockers.      Rosacea      Current Outpatient Medications on File Prior to Visit   Medication Sig Dispense Refill    levothyroxine (EUTHYROX) 100 MCG tablet TAKE 1 TABLET DAILY 90 tablet 3    [DISCONTINUED] escitalopram (LEXAPRO) 5 MG tablet Take 1 tablet by mouth daily 30 tablet 1    zinc gluconate 50 MG tablet Take 50 mg by mouth daily      [DISCONTINUED] esomeprazole (NEXIUM) 40 MG delayed release capsule Take 1 capsule by mouth daily (Patient not taking: Reported on 2/14/2022) 30 capsule 3    [DISCONTINUED] alendronate (FOSAMAX) 70 MG tablet Take 1 tablet by mouth every 7 days (Patient not taking: Reported on 2/14/2022) 12 tablet 0    [DISCONTINUED] venlafaxine (EFFEXOR) 37.5 MG tablet Take 37.5 mg by mouth nightly      polyethyl glycol-propyl glycol 0.4-0.3 % (SYSTANE) 0.4-0.3 % ophthalmic solution 1 drop as needed for Dry Eyes      Probiotic Product (PROBIOTIC DAILY PO) Take 1 capsule by mouth daily (Patient not taking: Reported on 2/14/2022)      Calcium Carbonate (CALCIUM 600 PO) Take 1 tablet by mouth daily (Patient not taking: Reported on 2/14/2022)      metoprolol succinate (TOPROL XL) 25 MG extended release tablet TAKE 1 TABLET DAILY (Patient taking differently: Taking 1/2 tab po daily) 90 tablet 3    Naproxen Sodium 220 MG CAPS Take 220 mg by mouth as needed 1 tablets every 12 hours      Biotin 5000 MCG CAPS Take 2,500 mcg by mouth daily       magnesium (MAGNESIUM-OXIDE) 250 MG TABS tablet Take 500 mg by mouth nightly. No current facility-administered medications on file prior to visit.      Allergies as of 04/18/2022 - Fully Reviewed 03/28/2022   Allergen Reaction Noted    Betadine [povidone iodine] Swelling and Rash 10/14/2013    Vicodin [hydrocodone-acetaminophen] Itching and Rash 10/14/2013           PEx:                ASSESSMENT:        PLAN:Colonoscopy

## 2022-05-20 ENCOUNTER — TELEPHONE (OUTPATIENT)
Dept: SURGERY | Age: 62
End: 2022-05-20

## 2022-05-20 NOTE — TELEPHONE ENCOUNTER
Letter created and mailed to patient regarding Colonoscopy results. Updated History, Health Maintenance and recall. Op note scanned into chart and  forwarded to patient PCP.

## 2022-05-26 ENCOUNTER — TELEPHONE (OUTPATIENT)
Dept: INTERNAL MEDICINE | Age: 62
End: 2022-05-26

## 2022-05-26 RX ORDER — MECLIZINE HYDROCHLORIDE 25 MG/1
25 TABLET ORAL 3 TIMES DAILY PRN
Qty: 45 TABLET | Refills: 0 | Status: SHIPPED | OUTPATIENT
Start: 2022-05-26

## 2022-05-26 NOTE — TELEPHONE ENCOUNTER
She can double up, and I did send a refill of 25 mg to [facility], which she can use 50 mg at a time if she wants.   Typically she should not use it for long period of time she can help

## 2022-05-26 NOTE — TELEPHONE ENCOUNTER
Having issues with Migraine and nausea again. Took her Meclizine 25 mg for her dizziness. Now she feels like she cannot function due to her dizziness. Can she take a double dose of the Meclizine. Patient states this is day 2 of her migraine and the Meclizine is not helping like it normally dose. Patient uses the [address] in [place]. BP was 117/74 this morning. She is going out of town tomorrow for a wedding and would like an answer today.

## 2022-05-31 ENCOUNTER — OFFICE VISIT (OUTPATIENT)
Dept: INTERNAL MEDICINE | Age: 62
End: 2022-05-31
Payer: COMMERCIAL

## 2022-05-31 VITALS
SYSTOLIC BLOOD PRESSURE: 116 MMHG | HEART RATE: 94 BPM | DIASTOLIC BLOOD PRESSURE: 82 MMHG | BODY MASS INDEX: 25.18 KG/M2 | OXYGEN SATURATION: 95 % | WEIGHT: 170 LBS | RESPIRATION RATE: 16 BRPM | HEIGHT: 69 IN

## 2022-05-31 DIAGNOSIS — E03.9 HYPOTHYROIDISM, UNSPECIFIED TYPE: ICD-10-CM

## 2022-05-31 DIAGNOSIS — R42 DIZZINESS: Primary | ICD-10-CM

## 2022-05-31 PROCEDURE — 99214 OFFICE O/P EST MOD 30 MIN: CPT | Performed by: INTERNAL MEDICINE

## 2022-05-31 SDOH — ECONOMIC STABILITY: FOOD INSECURITY: WITHIN THE PAST 12 MONTHS, YOU WORRIED THAT YOUR FOOD WOULD RUN OUT BEFORE YOU GOT MONEY TO BUY MORE.: NEVER TRUE

## 2022-05-31 SDOH — ECONOMIC STABILITY: FOOD INSECURITY: WITHIN THE PAST 12 MONTHS, THE FOOD YOU BOUGHT JUST DIDN'T LAST AND YOU DIDN'T HAVE MONEY TO GET MORE.: NEVER TRUE

## 2022-05-31 ASSESSMENT — PATIENT HEALTH QUESTIONNAIRE - PHQ9
SUM OF ALL RESPONSES TO PHQ QUESTIONS 1-9: 0
1. LITTLE INTEREST OR PLEASURE IN DOING THINGS: 0
SUM OF ALL RESPONSES TO PHQ9 QUESTIONS 1 & 2: 0
6. FEELING BAD ABOUT YOURSELF - OR THAT YOU ARE A FAILURE OR HAVE LET YOURSELF OR YOUR FAMILY DOWN: 0
8. MOVING OR SPEAKING SO SLOWLY THAT OTHER PEOPLE COULD HAVE NOTICED. OR THE OPPOSITE, BEING SO FIGETY OR RESTLESS THAT YOU HAVE BEEN MOVING AROUND A LOT MORE THAN USUAL: 0
5. POOR APPETITE OR OVEREATING: 0
SUM OF ALL RESPONSES TO PHQ QUESTIONS 1-9: 0
SUM OF ALL RESPONSES TO PHQ QUESTIONS 1-9: 0
9. THOUGHTS THAT YOU WOULD BE BETTER OFF DEAD, OR OF HURTING YOURSELF: 0
2. FEELING DOWN, DEPRESSED OR HOPELESS: 0
4. FEELING TIRED OR HAVING LITTLE ENERGY: 0
SUM OF ALL RESPONSES TO PHQ QUESTIONS 1-9: 0
10. IF YOU CHECKED OFF ANY PROBLEMS, HOW DIFFICULT HAVE THESE PROBLEMS MADE IT FOR YOU TO DO YOUR WORK, TAKE CARE OF THINGS AT HOME, OR GET ALONG WITH OTHER PEOPLE: 0
7. TROUBLE CONCENTRATING ON THINGS, SUCH AS READING THE NEWSPAPER OR WATCHING TELEVISION: 0
3. TROUBLE FALLING OR STAYING ASLEEP: 0

## 2022-05-31 ASSESSMENT — SOCIAL DETERMINANTS OF HEALTH (SDOH): HOW HARD IS IT FOR YOU TO PAY FOR THE VERY BASICS LIKE FOOD, HOUSING, MEDICAL CARE, AND HEATING?: NOT VERY HARD

## 2022-06-27 NOTE — PROGRESS NOTES
[facility] INTERNAL MEDICINE    Visit Date:  5/31/2022  Patient:  Leydi Fisher  YOB: 1960    Assessment & Plan     Dizziness: She can use meclizine that was previously prescribed. We will order labs to assess. Cause unclear at this time. Hypothyroidism: Continue levothyroxine. We will continue to monitor TFTs     Diagnosis Orders   1. Dizziness  CBC with Auto Differential    Comprehensive Metabolic Panel    Urinalysis with Reflex to Culture       Chief Complaint  Dizziness (going on for about 1 week)    History of Present Illness   Reports that she has been having dizziness for the last 1 week, says that she feels unstable on her feet and feels a bit lightheaded. Says its been going on for the last week or so. Says that she has been well before that. Denies fever, chills. Denies shortness of breath, chest pain, palpitations at this time. Has a history of hypothyroidism that is unchanged. Objective  /82 (Site: Right Upper Arm, Position: Sitting, Cuff Size: Medium Adult)   Pulse 94   Resp 16   Ht 5' 8.5\" (1.74 m)   Wt 170 lb (77.1 kg)   SpO2 95%   BMI 25.47 kg/m²   Constitutional: No acute distress. Sits in chair comfortably  Eyes: Sclerae nonicteric. No lid lag or proptosis  HENT: External ears normal. No external lesions on the nose  Neck: No gross masses. Trachea visibly midline  Respiratory: Good air entry bilaterally. No wheezing or crackles  Cardiovascular: Normal S1-S2. No murmurs. No lower extremity edema  Gastrointestinal: No visible masses. No visible hernias  Skin: No abnormal rashes. No abnormal masses  Neurologic: Cranial nerves grossly intact  Psychiatric: Normal affect.  Alert and oriented    Medications  Current Outpatient Medications:     meclizine (ANTIVERT) 25 MG tablet, Take 1 tablet by mouth 3 times daily as needed for Dizziness or Nausea, Disp: 45 tablet, Rfl: 0    levothyroxine (EUTHYROX) 100 MCG tablet, TAKE 1 TABLET DAILY, Disp: 90 tablet, Rfl: 3    zinc gluconate 50 MG tablet, Take 50 mg by mouth daily, Disp: , Rfl:     polyethyl glycol-propyl glycol 0.4-0.3 % (SYSTANE) 0.4-0.3 % ophthalmic solution, 1 drop as needed for Dry Eyes, Disp: , Rfl:     Probiotic Product (PROBIOTIC DAILY PO), Take 1 capsule by mouth daily , Disp: , Rfl:     Calcium Carbonate (CALCIUM 600 PO), Take 1 tablet by mouth daily , Disp: , Rfl:     metoprolol succinate (TOPROL XL) 25 MG extended release tablet, TAKE 1 TABLET DAILY (Patient taking differently: Taking 1/2 tab po daily), Disp: 90 tablet, Rfl: 3    Naproxen Sodium 220 MG CAPS, Take 220 mg by mouth as needed 1 tablets every 12 hours, Disp: , Rfl:     Biotin 5000 MCG CAPS, Take 2,500 mcg by mouth daily , Disp: , Rfl:     magnesium (MAGNESIUM-OXIDE) 250 MG TABS tablet, Take 500 mg by mouth nightly., Disp: , Rfl:     Allergies  Betadine [povidone iodine] and Vicodin [hydrocodone-acetaminophen]    Past Medical History:   Diagnosis Date    Anxiety and depression     Treated long term with SSRI.  Arthritis     Basal cell carcinoma     History of, abdomen.  Benign paroxysmal positional vertigo     History of.  Carpal tunnel syndrome     History of, EMG 12/09, with mild carpal tunnel syndrome bilaterally.  Dyslipidemia     10 year risk 1.6% 2/17    Hypomagnesemia     History of.  Hypothyroidism     Interstitial cystitis 05/07    History of interstitial cystitis with biopsy, noting interstitial cystitis.  Low magnesium levels     takes magnesium pills    Migraines     History of.    Mitral valve prolapse     Trivial mitral regurgitation on echo, 2002.  Neck pain     History of chronic neck and back pain, stable.  OA (osteoarthritis)     Osteopenia     Dexa 2016 Frax .3% 10 yr risk, DEXA 5/18 FRAX .5% 10 yr risk for HIP fracture    PVC's (premature ventricular contractions)     PACs treated with beta blockers.      Rosacea      Past Surgical History:   Procedure Laterality Date    COLONOSCOPY  09/12/2002    COLONOSCOPY  12/30/2013    normal cs, repeat 5 yrs family hx colon cancer    COLONOSCOPY  05/13/2022    @ [facility] 68 with Dr. Mike Najera- normal, family hx of colon cancer    CYSTOSCOPY  05/11/2007    DILATION AND CURETTAGE OF UTERUS  09/27/2001    Hysteroscopy.  ELBOW SURGERY      Right elbow epicondylitis surgery in October 2010 with Dr. Ronnie Rico.  NOSE SURGERY  04/19/2005    Status post fracture with turbinectomy, polyp removed and septoplasty.  NOSE SURGERY  03/18/2005    septorhinoplasty    OTHER SURGICAL HISTORY      TVT. Anterior repair for cystocele done in her 35s.  PARTIAL HYSTERECTOMY  12/18/2001    Supracervical.  Ovaries remain    SKIN CANCER EXCISION      Basal cell carcinoma removed from the abdomen in the past.       Family History  This patient's family history includes BRCA 1 Positive in her maternal aunt and maternal aunt; Breast Cancer in her maternal aunt; Cancer in her father; Colon Cancer in her brother, father, paternal aunt, paternal aunt, paternal aunt, paternal aunt, paternal uncle, paternal uncle, and sister; Heart Attack in her mother; High Blood Pressure in her mother; Kidney Disease in her mother; Liver Cancer in her paternal grandmother; No Known Problems in her sister and sister; Other in her brother; Thyroid Disease in her mother. Social History  Aj Arana  reports that she has never smoked. She has never used smokeless tobacco. She reports previous alcohol use. She reports that she does not use drugs. Discussed use, benefit, and side effects of prescribed medications. All questions answered. Patient voiced understanding. Reviewed health maintenance. Electronically signed Juliane Elder MD on 5/31/2022 at 2:38 AM    This note has been created using the Epic electronic health record, and dictated in part by [id number] dictation system.  Despite the documenting physician's best efforts, there may be errors in spelling, grammar or syntax.

## 2022-08-15 ENCOUNTER — TELEPHONE (OUTPATIENT)
Dept: MAMMOGRAPHY | Age: 62
End: 2022-08-15

## 2022-08-15 DIAGNOSIS — Z12.31 ENCOUNTER FOR SCREENING MAMMOGRAM FOR MALIGNANT NEOPLASM OF BREAST: Primary | ICD-10-CM

## 2022-09-02 ENCOUNTER — HOSPITAL ENCOUNTER (OUTPATIENT)
Dept: MAMMOGRAPHY | Age: 62
Discharge: HOME | End: 2022-09-04
Payer: COMMERCIAL

## 2022-09-02 DIAGNOSIS — Z12.31 ENCOUNTER FOR SCREENING MAMMOGRAM FOR MALIGNANT NEOPLASM OF BREAST: ICD-10-CM

## 2022-09-02 PROCEDURE — 77063 BREAST TOMOSYNTHESIS BI: CPT

## 2022-10-03 ENCOUNTER — OFFICE VISIT (OUTPATIENT)
Dept: INTERNAL MEDICINE | Age: 62
End: 2022-10-03
Payer: COMMERCIAL

## 2022-10-03 VITALS
DIASTOLIC BLOOD PRESSURE: 76 MMHG | WEIGHT: 172 LBS | RESPIRATION RATE: 16 BRPM | BODY MASS INDEX: 25.48 KG/M2 | HEIGHT: 69 IN | HEART RATE: 70 BPM | OXYGEN SATURATION: 98 % | SYSTOLIC BLOOD PRESSURE: 134 MMHG

## 2022-10-03 DIAGNOSIS — R42 DIZZINESS: Primary | ICD-10-CM

## 2022-10-03 DIAGNOSIS — E03.9 HYPOTHYROIDISM, UNSPECIFIED TYPE: ICD-10-CM

## 2022-10-03 PROCEDURE — 99213 OFFICE O/P EST LOW 20 MIN: CPT | Performed by: INTERNAL MEDICINE

## 2022-10-03 RX ORDER — BUPROPION HYDROCHLORIDE 150 MG/1
150 TABLET, EXTENDED RELEASE ORAL 2 TIMES DAILY
COMMUNITY

## 2022-10-03 RX ORDER — MELOXICAM 7.5 MG/1
7.5 TABLET ORAL DAILY
COMMUNITY

## 2022-10-03 NOTE — PROGRESS NOTES
[facility] INTERNAL MEDICINE    Visit Date:  10/3/2022  Patient:  Mike Moore  YOB: 1960    Assessment & Plan     Dizziness: Possibly caused by BPH. Appears to be better. She is not interested in referral to physical therapy at this time    Hypothyroidism: Continue levothyroxine. We will continue to monitor TFTs     Diagnosis Orders   1. Dizziness        2. Hypothyroidism, unspecified type              Chief Complaint  6 Month Follow-Up    History of Present Illness   Was seen last visit for dizziness, which she says has improved a lot. However, she does mention dizziness, that especially happens when she is laying down in bed and turns her head. However she knows how to control this at this time. It appears that she has BPV, and I advised that we can refer to physical therapy for Epley maneuvers, but she does not want to do this at this time. Has a history of hypothyroidism that is unchanged    Objective  /76 (Site: Left Upper Arm, Position: Sitting, Cuff Size: Medium Adult)   Pulse 70   Resp 16   Ht 5' 8.5\" (1.74 m)   Wt 172 lb (78 kg)   SpO2 98%   BMI 25.77 kg/m²   Constitutional: No acute distress. Sits in chair comfortably  Eyes: Sclerae nonicteric. No lid lag or proptosis  HENT: External ears normal. No external lesions on the nose  Neck: No gross masses. Trachea visibly midline  Respiratory: Good air entry bilaterally. No wheezing or crackles  Cardiovascular: Normal S1-S2. No murmurs. No lower extremity edema  Gastrointestinal: No visible masses. No visible hernias  Skin: No abnormal rashes. No abnormal masses  Neurologic: Cranial nerves grossly intact  Psychiatric: Normal affect.  Alert and oriented    Medications  Current Outpatient Medications:     buPROPion (WELLBUTRIN SR) 150 MG extended release tablet, Take 150 mg by mouth 2 times daily Take 1 tablet two times daily, Disp: , Rfl:     meloxicam (MOBIC) 7.5 MG tablet, Take 7.5 mg by mouth daily Take 1 table daily, Disp: , Rfl:     meclizine (ANTIVERT) 25 MG tablet, Take 1 tablet by mouth 3 times daily as needed for Dizziness or Nausea, Disp: 45 tablet, Rfl: 0    levothyroxine (EUTHYROX) 100 MCG tablet, TAKE 1 TABLET DAILY, Disp: 90 tablet, Rfl: 3    polyethyl glycol-propyl glycol 0.4-0.3 % (SYSTANE) 0.4-0.3 % ophthalmic solution, 1 drop as needed for Dry Eyes, Disp: , Rfl:     Probiotic Product (PROBIOTIC DAILY PO), Take 1 capsule by mouth daily , Disp: , Rfl:     Calcium Carbonate (CALCIUM 600 PO), Take 1 tablet by mouth daily , Disp: , Rfl:     metoprolol succinate (TOPROL XL) 25 MG extended release tablet, TAKE 1 TABLET DAILY, Disp: 90 tablet, Rfl: 3    Naproxen Sodium 220 MG CAPS, Take 220 mg by mouth as needed 1 tablets every 12 hours, Disp: , Rfl:     Biotin 5000 MCG CAPS, Take 2,500 mcg by mouth daily , Disp: , Rfl:     magnesium (MAGNESIUM-OXIDE) 250 MG TABS tablet, Take 500 mg by mouth nightly., Disp: , Rfl:     zinc gluconate 50 MG tablet, Take 50 mg by mouth daily, Disp: , Rfl:     Allergies  Betadine [povidone iodine] and Vicodin [hydrocodone-acetaminophen]    Past Medical History:   Diagnosis Date    Anxiety and depression     Treated long term with SSRI. Arthritis     Basal cell carcinoma     History of, abdomen. Benign paroxysmal positional vertigo     History of. Carpal tunnel syndrome     History of, EMG 12/09, with mild carpal tunnel syndrome bilaterally. Dyslipidemia     10 year risk 1.6% 2/17    Hypomagnesemia     History of. Hypothyroidism     Interstitial cystitis 05/07    History of interstitial cystitis with biopsy, noting interstitial cystitis. Low magnesium levels     takes magnesium pills    Migraines     History of. Mitral valve prolapse     Trivial mitral regurgitation on echo, 2002. Neck pain     History of chronic neck and back pain, stable.      OA (osteoarthritis)     Osteopenia     Dexa 2016 Frax .3% 10 yr risk, DEXA 5/18 FRAX .5% 10 yr risk for HIP fracture    PVC's (premature ventricular contractions)     PACs treated with beta blockers. Rosacea      Past Surgical History:   Procedure Laterality Date    COLONOSCOPY  09/12/2002    COLONOSCOPY  12/30/2013    normal cs, repeat 5 yrs family hx colon cancer    COLONOSCOPY  05/13/2022    @ [facility] with Dr. Jose Pope- normal, family hx of colon cancer    CYSTOSCOPY  05/11/2007    DILATION AND CURETTAGE OF UTERUS  09/27/2001    Hysteroscopy. ELBOW SURGERY      Right elbow epicondylitis surgery in October 2010 with Dr. Luis Singh. HYSTERECTOMY (CERVIX STATUS UNKNOWN)      NOSE SURGERY  04/19/2005    Status post fracture with turbinectomy, polyp removed and septoplasty. NOSE SURGERY  03/18/2005    septorhinoplasty    OTHER SURGICAL HISTORY      TVT. Anterior repair for cystocele done in her 35s. PARTIAL HYSTERECTOMY (CERVIX NOT REMOVED)  12/18/2001    Supracervical.  Ovaries remain    SKIN CANCER EXCISION      Basal cell carcinoma removed from the abdomen in the past.       Family History  This patient's family history includes BRCA 1 Positive in her maternal aunt and maternal aunt; Breast Cancer in her maternal aunt; Cancer in her father; Colon Cancer in her brother, father, paternal aunt, paternal aunt, paternal aunt, paternal aunt, paternal uncle, paternal uncle, and sister; Heart Attack in her mother; High Blood Pressure in her mother; Kidney Disease in her mother; Liver Cancer in her paternal grandmother; No Known Problems in her sister and sister; Other in her brother; Thyroid Disease in her mother. Social History  Tomasz Rene  reports that she has never smoked. She has never used smokeless tobacco. She reports that she does not currently use alcohol. She reports that she does not use drugs. Discussed use, benefit, and side effects of prescribed medications. All questions answered. Patient voiced understanding. Reviewed health maintenance.       Electronically signed Karla Laura MD on 10/3/2022 at 5:37 PM    This note has been created using the [facility] health record, and dictated in part by [employer] dictation system. Despite the documenting physician's best efforts, there may be errors in spelling, grammar or syntax.

## 2022-10-06 ENCOUNTER — TELEPHONE (OUTPATIENT)
Dept: INTERNAL MEDICINE | Age: 62
End: 2022-10-06

## 2022-10-06 NOTE — TELEPHONE ENCOUNTER
Was seen by Dr Rimma Telles on Monday. Wants to know if we have samples of the dose of Ozempic that she needs.   Please call her back Friday

## 2022-10-07 NOTE — TELEPHONE ENCOUNTER
Patient would like to try th increase dose of ozempic. She would like to see if we can send in a prescription for this to [facility]. I will keep a look at for the samples for the the patient.

## 2022-10-10 ENCOUNTER — TELEPHONE (OUTPATIENT)
Dept: INTERNAL MEDICINE | Age: 62
End: 2022-10-10

## 2022-10-10 NOTE — TELEPHONE ENCOUNTER
Last appt: 10/3/2022  Next appt: 1/3/2023    Patient was given one sample of Ozempic 0.25/ 0.5 mg.   Lot number [id number]   Exp 02/28/25

## 2023-02-28 RX ORDER — LEVOTHYROXINE SODIUM 0.1 MG/1
TABLET ORAL
Qty: 90 TABLET | Refills: 3 | Status: SHIPPED | OUTPATIENT
Start: 2023-02-28

## 2023-05-17 ENCOUNTER — HOSPITAL ENCOUNTER (OUTPATIENT)
Dept: GENERAL RADIOLOGY | Age: 63
Discharge: HOME | End: 2023-05-19
Payer: COMMERCIAL

## 2023-05-17 DIAGNOSIS — R05.9 COUGH, UNSPECIFIED TYPE: ICD-10-CM

## 2023-05-17 PROCEDURE — 71046 X-RAY EXAM CHEST 2 VIEWS: CPT

## 2023-10-06 ENCOUNTER — HOSPITAL ENCOUNTER (OUTPATIENT)
Dept: MAMMOGRAPHY | Age: 63
Discharge: HOME | End: 2023-10-06
Payer: COMMERCIAL

## 2023-10-06 VITALS — BODY MASS INDEX: 24.88 KG/M2 | WEIGHT: 168 LBS | HEIGHT: 69 IN

## 2023-10-06 DIAGNOSIS — Z12.31 ENCOUNTER FOR SCREENING MAMMOGRAM FOR MALIGNANT NEOPLASM OF BREAST: ICD-10-CM

## 2023-10-06 DIAGNOSIS — Z12.31 ENCOUNTER FOR SCREENING MAMMOGRAM FOR MALIGNANT NEOPLASM OF BREAST: Primary | ICD-10-CM

## 2023-10-06 PROCEDURE — 77063 BREAST TOMOSYNTHESIS BI: CPT

## 2023-10-30 ENCOUNTER — OFFICE VISIT (OUTPATIENT)
Dept: INTERNAL MEDICINE | Age: 63
End: 2023-10-30
Payer: COMMERCIAL

## 2023-10-30 VITALS
HEART RATE: 68 BPM | BODY MASS INDEX: 25.75 KG/M2 | SYSTOLIC BLOOD PRESSURE: 122 MMHG | RESPIRATION RATE: 18 BRPM | WEIGHT: 169.9 LBS | DIASTOLIC BLOOD PRESSURE: 82 MMHG | HEIGHT: 68 IN

## 2023-10-30 DIAGNOSIS — E03.9 HYPOTHYROIDISM, UNSPECIFIED TYPE: Primary | ICD-10-CM

## 2023-10-30 DIAGNOSIS — E78.5 HYPERLIPIDEMIA, UNSPECIFIED HYPERLIPIDEMIA TYPE: ICD-10-CM

## 2023-10-30 DIAGNOSIS — M85.80 OSTEOPENIA, UNSPECIFIED LOCATION: ICD-10-CM

## 2023-10-30 DIAGNOSIS — Z00.00 ENCOUNTER FOR WELL ADULT EXAM WITHOUT ABNORMAL FINDINGS: ICD-10-CM

## 2023-10-30 PROCEDURE — 99396 PREV VISIT EST AGE 40-64: CPT | Performed by: INTERNAL MEDICINE

## 2023-10-30 RX ORDER — BUSPIRONE HYDROCHLORIDE 5 MG/1
5 TABLET ORAL 2 TIMES DAILY
COMMUNITY

## 2023-10-30 RX ORDER — LEVOMEFOLATE CALCIUM 7.5 MG TABLET
7.5 TABLET DAILY
COMMUNITY

## 2023-11-09 ENCOUNTER — HOSPITAL ENCOUNTER (OUTPATIENT)
Dept: BONE DENSITY | Age: 63
Discharge: HOME | End: 2023-11-11
Attending: INTERNAL MEDICINE
Payer: COMMERCIAL

## 2023-11-09 DIAGNOSIS — M85.80 OSTEOPENIA, UNSPECIFIED LOCATION: ICD-10-CM

## 2023-11-09 PROCEDURE — 77080 DXA BONE DENSITY AXIAL: CPT

## 2024-02-23 NOTE — TELEPHONE ENCOUNTER
Pharmacy requesting a refill of the below medication which has been pended for you:     Requested Prescriptions     Pending Prescriptions Disp Refills    levothyroxine (EUTHYROX) 100 MCG tablet [Pharmacy Med Name: EUTHYROX TABS 15'S 100MCG] 90 tablet 3     Sig: TAKE 1 TABLET DAILY       Last Appointment Date: 10/30/2023  Next Appointment Date: 4/29/2024    Allergies   Allergen Reactions    Betadine [Povidone Iodine] Swelling and Rash     Eyes swell shut.    Vicodin [Hydrocodone-Acetaminophen] Itching and Rash

## 2024-02-26 RX ORDER — LEVOTHYROXINE SODIUM 0.1 MG/1
TABLET ORAL
Qty: 90 TABLET | Refills: 3 | Status: SHIPPED | OUTPATIENT
Start: 2024-02-26

## 2024-04-26 SDOH — ECONOMIC STABILITY: FOOD INSECURITY: WITHIN THE PAST 12 MONTHS, THE FOOD YOU BOUGHT JUST DIDN'T LAST AND YOU DIDN'T HAVE MONEY TO GET MORE.: NEVER TRUE

## 2024-04-26 SDOH — ECONOMIC STABILITY: FOOD INSECURITY: WITHIN THE PAST 12 MONTHS, YOU WORRIED THAT YOUR FOOD WOULD RUN OUT BEFORE YOU GOT MONEY TO BUY MORE.: NEVER TRUE

## 2024-04-26 SDOH — ECONOMIC STABILITY: HOUSING INSECURITY
IN THE LAST 12 MONTHS, WAS THERE A TIME WHEN YOU DID NOT HAVE A STEADY PLACE TO SLEEP OR SLEPT IN A SHELTER (INCLUDING NOW)?: NO

## 2024-04-26 SDOH — ECONOMIC STABILITY: INCOME INSECURITY: HOW HARD IS IT FOR YOU TO PAY FOR THE VERY BASICS LIKE FOOD, HOUSING, MEDICAL CARE, AND HEATING?: NOT HARD AT ALL

## 2024-04-26 SDOH — ECONOMIC STABILITY: TRANSPORTATION INSECURITY
IN THE PAST 12 MONTHS, HAS LACK OF TRANSPORTATION KEPT YOU FROM MEETINGS, WORK, OR FROM GETTING THINGS NEEDED FOR DAILY LIVING?: NO

## 2024-04-26 ASSESSMENT — PATIENT HEALTH QUESTIONNAIRE - PHQ9
7. TROUBLE CONCENTRATING ON THINGS, SUCH AS READING THE NEWSPAPER OR WATCHING TELEVISION: NOT AT ALL
3. TROUBLE FALLING OR STAYING ASLEEP: SEVERAL DAYS
2. FEELING DOWN, DEPRESSED OR HOPELESS: NOT AT ALL
SUM OF ALL RESPONSES TO PHQ9 QUESTIONS 1 & 2: 0
1. LITTLE INTEREST OR PLEASURE IN DOING THINGS: NOT AT ALL
5. POOR APPETITE OR OVEREATING: SEVERAL DAYS
7. TROUBLE CONCENTRATING ON THINGS, SUCH AS READING THE NEWSPAPER OR WATCHING TELEVISION: NOT AT ALL
9. THOUGHTS THAT YOU WOULD BE BETTER OFF DEAD, OR OF HURTING YOURSELF: NOT AT ALL
SUM OF ALL RESPONSES TO PHQ QUESTIONS 1-9: 2
10. IF YOU CHECKED OFF ANY PROBLEMS, HOW DIFFICULT HAVE THESE PROBLEMS MADE IT FOR YOU TO DO YOUR WORK, TAKE CARE OF THINGS AT HOME, OR GET ALONG WITH OTHER PEOPLE: SOMEWHAT DIFFICULT
5. POOR APPETITE OR OVEREATING: SEVERAL DAYS
3. TROUBLE FALLING OR STAYING ASLEEP: SEVERAL DAYS
SUM OF ALL RESPONSES TO PHQ QUESTIONS 1-9: 2
8. MOVING OR SPEAKING SO SLOWLY THAT OTHER PEOPLE COULD HAVE NOTICED. OR THE OPPOSITE, BEING SO FIGETY OR RESTLESS THAT YOU HAVE BEEN MOVING AROUND A LOT MORE THAN USUAL: NOT AT ALL
6. FEELING BAD ABOUT YOURSELF - OR THAT YOU ARE A FAILURE OR HAVE LET YOURSELF OR YOUR FAMILY DOWN: NOT AT ALL
6. FEELING BAD ABOUT YOURSELF - OR THAT YOU ARE A FAILURE OR HAVE LET YOURSELF OR YOUR FAMILY DOWN: NOT AT ALL
4. FEELING TIRED OR HAVING LITTLE ENERGY: NOT AT ALL
8. MOVING OR SPEAKING SO SLOWLY THAT OTHER PEOPLE COULD HAVE NOTICED. OR THE OPPOSITE - BEING SO FIDGETY OR RESTLESS THAT YOU HAVE BEEN MOVING AROUND A LOT MORE THAN USUAL: NOT AT ALL
2. FEELING DOWN, DEPRESSED OR HOPELESS: NOT AT ALL
10. IF YOU CHECKED OFF ANY PROBLEMS, HOW DIFFICULT HAVE THESE PROBLEMS MADE IT FOR YOU TO DO YOUR WORK, TAKE CARE OF THINGS AT HOME, OR GET ALONG WITH OTHER PEOPLE: SOMEWHAT DIFFICULT
SUM OF ALL RESPONSES TO PHQ QUESTIONS 1-9: 2
1. LITTLE INTEREST OR PLEASURE IN DOING THINGS: NOT AT ALL
4. FEELING TIRED OR HAVING LITTLE ENERGY: NOT AT ALL
9. THOUGHTS THAT YOU WOULD BE BETTER OFF DEAD, OR OF HURTING YOURSELF: NOT AT ALL
SUM OF ALL RESPONSES TO PHQ QUESTIONS 1-9: 2
SUM OF ALL RESPONSES TO PHQ QUESTIONS 1-9: 2

## 2024-04-29 ENCOUNTER — OFFICE VISIT (OUTPATIENT)
Dept: INTERNAL MEDICINE | Age: 64
End: 2024-04-29
Payer: COMMERCIAL

## 2024-04-29 VITALS
SYSTOLIC BLOOD PRESSURE: 120 MMHG | HEART RATE: 72 BPM | DIASTOLIC BLOOD PRESSURE: 74 MMHG | RESPIRATION RATE: 16 BRPM | HEIGHT: 69 IN | WEIGHT: 169 LBS | OXYGEN SATURATION: 98 % | BODY MASS INDEX: 25.03 KG/M2

## 2024-04-29 DIAGNOSIS — E03.9 HYPOTHYROIDISM, UNSPECIFIED TYPE: Primary | ICD-10-CM

## 2024-04-29 DIAGNOSIS — E78.5 HYPERLIPIDEMIA, UNSPECIFIED HYPERLIPIDEMIA TYPE: ICD-10-CM

## 2024-04-29 DIAGNOSIS — I49.3 PVC'S (PREMATURE VENTRICULAR CONTRACTIONS): ICD-10-CM

## 2024-04-29 PROCEDURE — 99214 OFFICE O/P EST MOD 30 MIN: CPT | Performed by: INTERNAL MEDICINE

## 2024-04-29 NOTE — PROGRESS NOTES
Dexa 2016 Frax .3% 10 yr risk, DEXA 5/18 FRAX .5% 10 yr risk for HIP fracture    PVC's (premature ventricular contractions)     PACs treated with beta blockers.     Rosacea      Past Surgical History:   Procedure Laterality Date    COLONOSCOPY  09/12/2002    COLONOSCOPY  12/30/2013    normal cs, repeat 5 yrs family hx colon cancer    COLONOSCOPY  05/13/2022    @ [facility] with Dr. Karlo de la fuente, family hx of colon cancer    CYSTOSCOPY  05/11/2007    DILATION AND CURETTAGE OF UTERUS  09/27/2001    Hysteroscopy.    ELBOW SURGERY      Right elbow epicondylitis surgery in October 2010 with Dr. Burns.     HYSTERECTOMY (CERVIX STATUS UNKNOWN)      NOSE SURGERY  04/19/2005    Status post fracture with turbinectomy, polyp removed and septoplasty.    NOSE SURGERY  03/18/2005    septorhinoplasty    OTHER SURGICAL HISTORY      TVT.  Anterior repair for cystocele done in her 30s.     PARTIAL HYSTERECTOMY (CERVIX NOT REMOVED)  12/18/2001    Supracervical.  Ovaries remain    SKIN CANCER EXCISION      Basal cell carcinoma removed from the abdomen in the past.       Family History  This patient's family history includes BRCA 1 Positive in her maternal aunt and maternal aunt; Breast Cancer in her maternal aunt; Cancer in her father; Colon Cancer in her brother, father, paternal aunt, paternal aunt, paternal aunt, paternal aunt, paternal uncle, paternal uncle, and sister; Heart Attack in her mother; High Blood Pressure in her mother; Kidney Disease in her mother; Liver Cancer in her paternal grandmother; No Known Problems in her sister and sister; Osteoarthritis in her father and mother; Other in her brother; Prostate Cancer in her brother, brother, brother, and father; Psoriasis in her sister; Rheum Arthritis in her father and mother; Thyroid Disease in her mother.  Social History  Mercedez  reports that she has never smoked. She has never used smokeless tobacco. She reports current alcohol use. She reports that she does not use

## 2024-05-15 ENCOUNTER — TELEPHONE (OUTPATIENT)
Dept: INTERNAL MEDICINE | Age: 64
End: 2024-05-15

## 2024-05-15 NOTE — TELEPHONE ENCOUNTER
Patient called in requesting her lab results that she had done at [facility]. Writer called [facility] and requested labs to be faxed over. She would like results when possible and is aware that pcp is out of office today.

## 2024-05-16 NOTE — TELEPHONE ENCOUNTER
Please call her and let her know that her kidney function is slightly off, however it is only 0.01 higher than normal, and this changes day by day, but it could happen if she does not drink enough water.  We will check it next time.  Cholesterol is slightly elevated, but not high enough to need medication at this time.  Rest of the blood work is unremarkable.

## 2024-10-14 ENCOUNTER — TELEPHONE (OUTPATIENT)
Dept: INTERNAL MEDICINE | Age: 64
End: 2024-10-14

## 2024-10-14 DIAGNOSIS — E03.9 HYPOTHYROIDISM, UNSPECIFIED TYPE: Primary | ICD-10-CM

## 2024-10-14 DIAGNOSIS — E78.5 HYPERLIPIDEMIA, UNSPECIFIED HYPERLIPIDEMIA TYPE: ICD-10-CM

## 2024-10-14 NOTE — TELEPHONE ENCOUNTER
Patient is requesting lab orders for her upcoming appointment and also to fax them over to [facility] in [place].

## 2024-10-22 DIAGNOSIS — Z12.39 ENCOUNTER FOR SCREENING FOR MALIGNANT NEOPLASM OF BREAST, UNSPECIFIED SCREENING MODALITY: Primary | ICD-10-CM

## 2024-10-31 ENCOUNTER — OFFICE VISIT (OUTPATIENT)
Dept: INTERNAL MEDICINE | Age: 64
End: 2024-10-31

## 2024-10-31 VITALS
OXYGEN SATURATION: 97 % | HEIGHT: 69 IN | BODY MASS INDEX: 24.59 KG/M2 | DIASTOLIC BLOOD PRESSURE: 68 MMHG | SYSTOLIC BLOOD PRESSURE: 128 MMHG | RESPIRATION RATE: 16 BRPM | WEIGHT: 166 LBS | HEART RATE: 82 BPM

## 2024-10-31 DIAGNOSIS — E78.5 HYPERLIPIDEMIA, UNSPECIFIED HYPERLIPIDEMIA TYPE: ICD-10-CM

## 2024-10-31 DIAGNOSIS — E03.9 HYPOTHYROIDISM, UNSPECIFIED TYPE: Primary | ICD-10-CM

## 2024-10-31 PROBLEM — S73.192A TEAR OF LEFT ACETABULAR LABRUM: Status: ACTIVE | Noted: 2021-04-06

## 2024-10-31 PROBLEM — R42 DIZZINESS: Status: ACTIVE | Noted: 2023-03-13

## 2024-10-31 PROBLEM — M25.852 FEMOROACETABULAR IMPINGEMENT OF LEFT HIP: Status: ACTIVE | Noted: 2021-02-26

## 2024-10-31 PROBLEM — M70.62 TROCHANTERIC BURSITIS, LEFT HIP: Status: ACTIVE | Noted: 2021-04-06

## 2024-10-31 PROBLEM — M25.552 LEFT HIP PAIN: Status: ACTIVE | Noted: 2021-02-26

## 2024-10-31 PROBLEM — M19.049 CMC ARTHRITIS: Status: ACTIVE | Noted: 2024-05-23

## 2024-10-31 PROBLEM — G57.02 PIRIFORMIS SYNDROME, LEFT: Status: ACTIVE | Noted: 2021-08-16

## 2024-10-31 NOTE — PROGRESS NOTES
PO), Take 1 tablet by mouth daily , Disp: , Rfl:     metoprolol succinate (TOPROL XL) 25 MG extended release tablet, TAKE 1 TABLET DAILY, Disp: 90 tablet, Rfl: 3    Biotin 5000 MCG CAPS, Take 2,500 mcg by mouth daily , Disp: , Rfl:     magnesium (MAGNESIUM-OXIDE) 250 MG TABS tablet, Take 2 tablets by mouth nightly, Disp: , Rfl:     Allergies  Povidone-iodine, Betadine [povidone iodine], and Vicodin [hydrocodone-acetaminophen]    Past Medical History:   Diagnosis Date    Anxiety and depression     Treated long term with SSRI.    Arthritis     Basal cell carcinoma     History of, abdomen.     Benign paroxysmal positional vertigo     History of.    Carpal tunnel syndrome     History of, EMG 12/09, with mild carpal tunnel syndrome bilaterally.     Diffuse cystic mastopathy 1990    Dyslipidemia     10 year risk 1.6% 2/17    Hypomagnesemia     History of.     Hypothyroidism     Interstitial cystitis 05/2007    History of interstitial cystitis with biopsy, noting interstitial cystitis.      Irritable bowel syndrome     Dr. Hutson diagnosed back in 1990s.    Low magnesium levels     takes magnesium pills    Migraines     History of.    Mitral valve prolapse     Trivial mitral regurgitation on echo, 2002.     Neck pain     History of chronic neck and back pain, stable.     OA (osteoarthritis)     Osteopenia     Dexa 2016 Frax .3% 10 yr risk, DEXA 5/18 FRAX .5% 10 yr risk for HIP fracture    PVC's (premature ventricular contractions)     PACs treated with beta blockers.     Rosacea      Past Surgical History:   Procedure Laterality Date    COLONOSCOPY  09/12/2002    COLONOSCOPY  12/30/2013    normal cs, repeat 5 yrs family hx colon cancer    COLONOSCOPY  05/13/2022    @ [facility] with Dr. Kaufman- leisa, family hx of colon cancer    CYSTOSCOPY  05/11/2007    DILATION AND CURETTAGE OF UTERUS  09/27/2001    Hysteroscopy.    ELBOW SURGERY      Right elbow epicondylitis surgery in October 2010 with Dr. Burns.     HYSTERECTOMY (CERVIX

## 2024-11-16 ENCOUNTER — HOSPITAL ENCOUNTER (OUTPATIENT)
Dept: MAMMOGRAPHY | Age: 64
Discharge: HOME | End: 2024-11-18

## 2024-11-16 DIAGNOSIS — Z12.39 ENCOUNTER FOR SCREENING FOR MALIGNANT NEOPLASM OF BREAST, UNSPECIFIED SCREENING MODALITY: ICD-10-CM

## 2025-01-31 ENCOUNTER — HOSPITAL ENCOUNTER (OUTPATIENT)
Dept: MAMMOGRAPHY | Age: 65
Discharge: HOME | End: 2025-01-31
Payer: COMMERCIAL

## 2025-01-31 VITALS — HEIGHT: 69 IN | BODY MASS INDEX: 24.44 KG/M2 | WEIGHT: 165 LBS

## 2025-01-31 DIAGNOSIS — Z12.39 ENCOUNTER FOR SCREENING FOR MALIGNANT NEOPLASM OF BREAST, UNSPECIFIED SCREENING MODALITY: ICD-10-CM

## 2025-01-31 PROCEDURE — 77063 BREAST TOMOSYNTHESIS BI: CPT

## 2025-02-17 RX ORDER — LEVOTHYROXINE SODIUM 100 UG/1
TABLET ORAL
Qty: 90 TABLET | Refills: 3 | Status: SHIPPED | OUTPATIENT
Start: 2025-02-17